# Patient Record
Sex: FEMALE | Race: WHITE | Employment: STUDENT | URBAN - METROPOLITAN AREA
[De-identification: names, ages, dates, MRNs, and addresses within clinical notes are randomized per-mention and may not be internally consistent; named-entity substitution may affect disease eponyms.]

---
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
## 2024-01-16 ENCOUNTER — ANCILLARY PROCEDURE (OUTPATIENT)
Dept: PEDIATRIC CARDIOLOGY | Age: 19
End: 2024-01-16
Attending: INTERNAL MEDICINE

## 2024-01-16 ENCOUNTER — OFFICE VISIT (OUTPATIENT)
Dept: PEDIATRIC CARDIOLOGY | Age: 19
End: 2024-01-16
Attending: INTERNAL MEDICINE

## 2024-01-16 ENCOUNTER — TELEPHONE (OUTPATIENT)
Dept: CARDIOLOGY | Age: 19
End: 2024-01-16

## 2024-01-16 VITALS
BODY MASS INDEX: 17.79 KG/M2 | TEMPERATURE: 98.1 F | HEIGHT: 60 IN | WEIGHT: 90.61 LBS | HEART RATE: 76 BPM | DIASTOLIC BLOOD PRESSURE: 60 MMHG | OXYGEN SATURATION: 100 % | SYSTOLIC BLOOD PRESSURE: 103 MMHG

## 2024-01-16 DIAGNOSIS — Z98.890 STATUS POST CARDIAC SURGERY: ICD-10-CM

## 2024-01-16 DIAGNOSIS — Q21.10 ATRIAL SEPTAL DEFECT: ICD-10-CM

## 2024-01-16 DIAGNOSIS — Q26.3 PARTIAL ANOMALOUS PULMONARY VENOUS RETURN (PAPVR): ICD-10-CM

## 2024-01-16 DIAGNOSIS — Q21.16 ASD (ATRIAL SEPTAL DEFECT), SINUS VENOSUS DEFECT: ICD-10-CM

## 2024-01-16 DIAGNOSIS — Q21.14 SUPERIOR SINUS VENOSUS ATRIAL SEPTAL DEFECT (ASD): Primary | ICD-10-CM

## 2024-01-16 DIAGNOSIS — Q21.16 ASD (ATRIAL SEPTAL DEFECT), SINUS VENOSUS DEFECT: Primary | ICD-10-CM

## 2024-01-16 LAB
AORTIC ROOT: 2.1 CM (ref 1.95–2.76)
AORTIC VALVE ANNULUS: 1.5 CM (ref 1.38–2.01)
ASCENDING AORTA: 1.8 CM (ref 1.64–2.46)
BSA FOR PED ECHO PROCEDURE: 1.31 M2
FRACTIONAL SHORTENING MMODE: 36 %
IVSS (M-MODE): 0.87 CM
LEFT VENTRICULAR POSTERIOR WALL IN END DIASTOLE MMODE: 0.51 CM (ref 0.44–0.83)
LEFT VENTRICULAR POSTERIOR WALL SYSTOLE MMODE: 0.87 CM
LV END-DIASTOLIC ENDOCARDIAL DIAMETER MMODE: 3.86 CM (ref 3.65–5.13)
LV END-DIASTOLIC SEPTAL THICKNESS MMODE: 0.51 CM (ref 0.45–0.84)
LVIDS BY MMODE: 2.48 CM
RIGHT VENTRICULAR END DIASTOLIC DIAS: 1.79 CM
SINOTUBULAR JUNCTION: 1.8 CM (ref 1.57–2.3)
Z SCORE OF AORTIC VALVE ANNULUS PHN: -1.2 CM
Z SCORE OF LEFT VENTRICULAR POSTERIOR WALL IN END DIASTOLE MMODE: -1.2 CM
Z SCORE OF LV END-DIASTOLIC ENDOCARDIAL DIAMETER MMODE: -1.4 CM
Z SCORE OF LV END-DIASTOLIC SEPTAL THICKNESS MMODE: -1.4 CM
Z-SCORE OF AORTIC ROOT: -1.3 CM
Z-SCORE OF ASCENDING AORTA: -1.2 CM
Z-SCORE OF SINOTUBULAR JUNCTION PHN: -0.7 CM

## 2024-01-16 PROCEDURE — 93303 ECHO TRANSTHORACIC: CPT

## 2024-01-16 PROCEDURE — 93325 DOPPLER ECHO COLOR FLOW MAPG: CPT | Performed by: INTERNAL MEDICINE

## 2024-01-16 PROCEDURE — 93320 DOPPLER ECHO COMPLETE: CPT | Performed by: INTERNAL MEDICINE

## 2024-01-16 PROCEDURE — 93005 ELECTROCARDIOGRAM TRACING: CPT | Performed by: INTERNAL MEDICINE

## 2024-01-16 PROCEDURE — 93303 ECHO TRANSTHORACIC: CPT | Performed by: INTERNAL MEDICINE

## 2024-01-16 PROCEDURE — 99214 OFFICE O/P EST MOD 30 MIN: CPT | Performed by: INTERNAL MEDICINE

## 2024-01-16 ASSESSMENT — PAIN SCALES - GENERAL: PAINLEVEL: 0

## 2024-01-18 PROBLEM — I26.99 PE (PULMONARY THROMBOEMBOLISM) (HCC): Status: ACTIVE | Noted: 2023-06-20

## 2024-01-18 LAB
ATRIAL RATE (BPM): 77
P AXIS (DEGREES): 46
PR-INTERVAL (MSEC): 170
QRS-INTERVAL (MSEC): 78
QT-INTERVAL (MSEC): 368
QTC: 416
R AXIS (DEGREES): 125
REPORT TEXT: NORMAL
T AXIS (DEGREES): 65
VENTRICULAR RATE EKG/MIN (BPM): 77

## 2024-03-07 ENCOUNTER — HOSPITAL ENCOUNTER (OUTPATIENT)
Dept: GENERAL RADIOLOGY | Age: 19
Discharge: HOME | End: 2024-03-07
Attending: INTERNAL MEDICINE
Payer: COMMERCIAL

## 2024-03-07 DIAGNOSIS — R68.84 JAW PAIN: ICD-10-CM

## 2024-03-07 PROCEDURE — 70110 X-RAY EXAM OF JAW 4/> VIEWS: CPT | Performed by: INTERNAL MEDICINE

## 2024-04-04 ENCOUNTER — HOSPITAL ENCOUNTER (OUTPATIENT)
Age: 19
Discharge: HOME | End: 2024-04-04
Payer: COMMERCIAL

## 2024-04-04 VITALS
RESPIRATION RATE: 16 BRPM | TEMPERATURE: 98 F | OXYGEN SATURATION: 96 % | HEART RATE: 96 BPM | DIASTOLIC BLOOD PRESSURE: 80 MMHG | SYSTOLIC BLOOD PRESSURE: 107 MMHG

## 2024-04-04 DIAGNOSIS — N30.01 ACUTE CYSTITIS WITH HEMATURIA: Primary | ICD-10-CM

## 2024-04-04 LAB
B-HCG UR QL: NEGATIVE
BILIRUB UR QL STRIP: NEGATIVE
COLOR UR: YELLOW
GLUCOSE UR STRIP-MCNC: NEGATIVE MG/DL
KETONES UR STRIP-MCNC: NEGATIVE MG/DL
NITRITE UR QL STRIP: POSITIVE
PH UR STRIP: 7 [PH]
PROT UR STRIP-MCNC: 30 MG/DL
SP GR UR STRIP: 1.02
UROBILINOGEN UR STRIP-ACNC: <2 MG/DL

## 2024-04-04 PROCEDURE — 81002 URINALYSIS NONAUTO W/O SCOPE: CPT | Performed by: NURSE PRACTITIONER

## 2024-04-04 PROCEDURE — 87186 SC STD MICRODIL/AGAR DIL: CPT | Performed by: NURSE PRACTITIONER

## 2024-04-04 PROCEDURE — 87086 URINE CULTURE/COLONY COUNT: CPT | Performed by: NURSE PRACTITIONER

## 2024-04-04 PROCEDURE — 87088 URINE BACTERIA CULTURE: CPT | Performed by: NURSE PRACTITIONER

## 2024-04-04 PROCEDURE — 99213 OFFICE O/P EST LOW 20 MIN: CPT | Performed by: NURSE PRACTITIONER

## 2024-04-04 PROCEDURE — 81025 URINE PREGNANCY TEST: CPT | Performed by: NURSE PRACTITIONER

## 2024-04-04 RX ORDER — NITROFURANTOIN 25; 75 MG/1; MG/1
100 CAPSULE ORAL 2 TIMES DAILY
Qty: 10 CAPSULE | Refills: 0 | Status: SHIPPED | OUTPATIENT
Start: 2024-04-04 | End: 2024-04-09

## 2024-04-04 RX ORDER — PHENAZOPYRIDINE HYDROCHLORIDE 200 MG/1
200 TABLET, FILM COATED ORAL 3 TIMES DAILY PRN
Qty: 6 TABLET | Refills: 0 | Status: SHIPPED | OUTPATIENT
Start: 2024-04-04 | End: 2024-04-11

## 2024-04-04 NOTE — ED PROVIDER NOTES
Patient Seen in: [facility]      History     Chief Complaint   Patient presents with    Urinary Symptoms     Entered by patient     Stated Complaint: Urinary Symptoms    Subjective:   17-year-old female status post ASD repair, subsequent PE - no longer anticoagulated presents from home with concern for recurrent UTI.  States 2 days of mild hematuria with urgency and dysuria.  Associated foul-smelling urine.  No fever.  No vomiting.  Denies risk for STD.  Denies pregnancy.  States she recently had a UTI that was treated with antibiotics and resolved.  Prior to that no previous history of UTI.  Also notes left-sided pain intermittent for 2 months.    The history is provided by the patient. No  was used.         Objective:   History reviewed. No pertinent past medical history.           Past Surgical History:   Procedure Laterality Date    ANESTH,OPEN HEART SURGERY  06/14/2023                Social History     Socioeconomic History    Marital status: Single   Tobacco Use    Smoking status: Never    Smokeless tobacco: Never   Vaping Use    Vaping Use: Never used   Substance and Sexual Activity    Alcohol use: Never    Drug use: Never   Other Topics Concern    Second-hand smoke exposure No    Caffeine Concern No    Exercise No    Seat Belt No    Special Diet No    Stress Concern No    Weight Concern No              Review of Systems    Positive for stated complaint: Urinary Symptoms  Other systems are as noted in HPI.  Constitutional and vital signs reviewed.      All other systems reviewed and negative except as noted above.    Physical Exam     ED Triage Vitals [04/04/24 1128]   /80   Pulse 96   Resp 16   Temp 98 °F (36.7 °C)   Temp src Temporal   SpO2 96 %   O2 Device None (Room air)       Current:/80   Pulse 96   Temp 98 °F (36.7 °C) (Temporal)   Resp 16   LMP 03/07/2024 (Exact Date)   SpO2 96%         Physical Exam  Vitals and nursing note reviewed.    Constitutional:       General: She is not in acute distress.     Appearance: Normal appearance. She is not ill-appearing or toxic-appearing.   HENT:      Head: Normocephalic and atraumatic.      Nose: Nose normal.      Mouth/Throat:      Mouth: Mucous membranes are moist.      Pharynx: Oropharynx is clear.   Eyes:      Pupils: Pupils are equal, round, and reactive to light.   Cardiovascular:      Rate and Rhythm: Normal rate and regular rhythm.      Pulses: Normal pulses.   Pulmonary:      Effort: Pulmonary effort is normal. No respiratory distress.      Breath sounds: Normal breath sounds.      Comments: Lungs clear.  No adventitious lung sounds.  No distress.  No hypoxia.  Pulse ox 96% ra. Which is normal    Abdominal:      General: Abdomen is flat.      Palpations: Abdomen is soft.      Tenderness: There is no abdominal tenderness. There is no right CVA tenderness or left CVA tenderness.      Comments: Bladder pressure   Musculoskeletal:         General: No signs of injury. Normal range of motion.      Cervical back: Normal range of motion and neck supple.   Skin:     General: Skin is warm and dry.      Capillary Refill: Capillary refill takes less than 2 seconds.   Neurological:      General: No focal deficit present.      Mental Status: She is alert and oriented to person, place, and time.      GCS: GCS eye subscore is 4. GCS verbal subscore is 5. GCS motor subscore is 6.   Psychiatric:         Mood and Affect: Mood normal.         Behavior: Behavior normal.         Thought Content: Thought content normal.         Judgment: Judgment normal.         ED Course     Labs Reviewed   [facility] POCT URINALYSIS DIPSTICK - Abnormal; Notable for the following components:       Result Value    Urine Clarity Cloudy (*)     Protein urine 30 (*)     Blood, Urine Small (*)     Nitrite Urine Positive (*)     Leukocyte esterase urine Moderate (*)     All other components within normal limits   POCT PREGNANCY URINE - Normal   URINE  CULTURE, ROUTINE     Recent Results (from the past 24 hour(s))   POCT Urinalysis Dipstick    Collection Time: 04/04/24 11:40 AM   Result Value Ref Range    Urine Color Yellow Yellow    Urine Clarity Cloudy (A) Clear    Specific Gravity, Urine 1.025 1.005 - 1.030    PH, Urine 7.0 5.0 - 8.0    Protein urine 30 (A) Negative mg/dL    Glucose, Urine Negative Negative mg/dL    Ketone, Urine Negative Negative mg/dL    Bilirubin, Urine Negative Negative    Blood, Urine Small (A) Negative    Nitrite Urine Positive (A) Negative    Urobilinogen urine <2.0 <2.0 mg/dL    Leukocyte esterase urine Moderate (A) Negative   POCT Pregnancy, Urine    Collection Time: 04/04/24 11:44 AM   Result Value Ref Range    POCT Urine Pregnancy Negative Negative         MDM        Medical Decision Making  Differential diagnosis: UTI, pyelonephritis, STD  Pregnancy negative  UA showing infection with moderate leuks, positive nitrates, small blood, small protein.  Urine culture was sent.  No previous urine culture on file to review   Nontoxic-appearing on exam. No fever.  No vomiting.  Complaining of intermittent left side pain for several months.  No acute CVA tenderness.  No evidence of pyelonephritis or renal stone.  Denies risk for STD  Plan of care: Macrobid, Pyridium.  Push oral fluids.  Schedule follow-up with primary doctor    Results and plan of care discussed with the patient/family. They are in agreement with discharge. They understand to follow up with their primary doctor or the referral physician for further evaluation, especially if no improvement.  Also discussed the limitations of immediate care, patient is aware that if symptoms are worse they should go to the emergency room. Verbal and written discharge instructions were given.       Problems Addressed:  Acute cystitis with hematuria: acute illness or injury    Amount and/or Complexity of Data Reviewed  External Data Reviewed: notes.     Details: Seen at  12/23 given bactrim for  uti  ASD repair 6/14/23. PE 6/20/23  No previous urine culture on file  Labs: ordered. Decision-making details documented in ED Course.    Risk  OTC drugs.  Prescription drug management.        Disposition and Plan     Clinical Impression:  1. Acute cystitis with hematuria         Disposition:  Discharge  4/4/2024 11:50 am    Follow-up:  Alex Villegas MD  [address]  [place] [address]  [phone]                Medications Prescribed:  Discharge Medication List as of 4/4/2024 11:52 AM        START taking these medications    Details   nitrofurantoin monohydrate macro 100 MG Oral Cap Take 1 capsule (100 mg total) by mouth 2 (two) times daily for 5 days., Normal, Disp-10 capsule, R-0      phenazopyridine 200 MG Oral Tab Take 1 tablet (200 mg total) by mouth 3 (three) times daily as needed for Pain., Normal, Disp-6 tablet, R-0

## 2024-04-04 NOTE — DISCHARGE INSTRUCTIONS
Take the antibiotic twice a day until gone.  Take pyridium for the urge to urinate. Increase water intake.  Urine culture is pending and results will be available in about 48 hours.  We will call you if any medication changes are needed.  Schedule follow-up with your primary doctor.

## 2024-04-11 ENCOUNTER — OFFICE VISIT (OUTPATIENT)
Dept: OTOLARYNGOLOGY | Facility: CLINIC | Age: 19
End: 2024-04-11

## 2024-04-11 DIAGNOSIS — H92.01 RIGHT EAR PAIN: Primary | ICD-10-CM

## 2024-04-11 PROCEDURE — 99203 OFFICE O/P NEW LOW 30 MIN: CPT | Performed by: OTOLARYNGOLOGY

## 2024-04-11 RX ORDER — DOXYCYCLINE HYCLATE 100 MG
TABLET ORAL
COMMUNITY
Start: 2024-01-29

## 2024-04-11 RX ORDER — CELECOXIB 200 MG/1
200 CAPSULE ORAL DAILY PRN
Qty: 30 CAPSULE | Refills: 0 | Status: SHIPPED | OUTPATIENT
Start: 2024-04-11 | End: 2024-05-11

## 2024-04-11 RX ORDER — CYCLOBENZAPRINE HCL 5 MG
5 TABLET ORAL NIGHTLY
Qty: 30 TABLET | Refills: 1 | Status: SHIPPED | OUTPATIENT
Start: 2024-04-11

## 2024-04-11 NOTE — PROGRESS NOTES
Harriet Seals is a 18 year old female.    Chief Complaint   Patient presents with    Jaw Pain     Patient reports pain on the right side of jaw. X 2 years.        HISTORY OF PRESENT ILLNESS  She presents for history of developing ear discomfort and inability to open her mouth back about 2 years ago when she was in [place].  States that at that time she was chattering due to being very cold.  States that at some point she had difficulty opening her mouth completely and this resolved spontaneously over time.  Had other issues and was seen by holistic person who used warm heat to the area as well as chewing gum to alleviate her symptoms.  About 2 months ago things worsened significantly and now she has chronic pain and discomfort has difficulty chewing and pain with headaches in the temporoparietal region.  Has an interesting history of incidental finding of an EKG abnormality at school with ultimate finding of a hole in her heart which was recently repaired last summer.  She did have significant stress around that time.  Admits to grinding seen by dentist who essentially told her that nothing was wrong but never offered her a bite guard.  Signs, symptoms or complaints      Social History     Socioeconomic History    Marital status: Single   Tobacco Use    Smoking status: Never    Smokeless tobacco: Never   Vaping Use    Vaping status: Never Used   Substance and Sexual Activity    Alcohol use: Never    Drug use: Never   Other Topics Concern    Second-hand smoke exposure No    Caffeine Concern No    Exercise No    Seat Belt No    Special Diet No    Stress Concern No    Weight Concern No       Family History   Problem Relation Age of Onset    Hypertension Maternal Grandmother     Diabetes Maternal Grandmother     Hypertension Paternal Grandmother     Diabetes Paternal Grandmother     Glaucoma Neg     Heart Disorder Neg     Cancer Neg     Asthma Neg     Depression Neg     Lipids Neg        History reviewed. No pertinent  past medical history.    Past Surgical History:   Procedure Laterality Date    Anesth,open heart surgery  06/14/2023         REVIEW OF SYSTEMS    System Neg/Pos Details   Constitutional Negative Fatigue, fever and weight loss.   ENMT Negative Drooling.   Eyes Negative Blurred vision and vision changes.   Respiratory Negative Dyspnea and wheezing.   Cardio Negative Chest pain, irregular heartbeat/palpitations and syncope.   GI Negative Abdominal pain and diarrhea.   Endocrine Negative Cold intolerance and heat intolerance.   Neuro Negative Tremors.   Psych Negative Anxiety and depression.   Integumentary Negative Frequent skin infections, pigment change and rash.   Hema/Lymph Negative Easy bleeding and easy bruising.           PHYSICAL EXAM    [facility] 03/07/2024 (Exact Date)        Constitutional Normal Overall appearance - Normal.   Psychiatric Normal Orientation - Oriented to time, place, person & situation. Appropriate mood and affect.   Neck Exam Normal Inspection - Normal. Palpation - Normal. Parotid gland - Normal. Thyroid gland - Normal.   Eyes Normal Conjunctiva - Right: Normal, Left: Normal. Pupil - Right: Normal, Left: Normal. Fundus - Right: Normal, Left: Normal.   Neurological Normal Memory - Normal. Cranial nerves - Cranial nerves II through XII grossly intact.   Head/Face Normal Facial features - Normal. Eyebrows - Normal. Skull - Normal.        Nasopharynx Normal External nose - Normal. Lips/teeth/gums - Normal. Tonsils - Normal. Oropharynx - Normal.   Ears Normal Inspection - Right: Normal, Left: Normal. Canal - Right: Normal, Left: Normal. TM - Right: Normal, Left: Normal.   Skin Normal Inspection - Normal.        Lymph Detail Normal Submental. Submandibular. Anterior cervical. Posterior cervical. Supraclavicular.   TMJ  Tender to palpation bilaterally   Nose/Mouth/Throat Normal External nose - Normal. Lips/teeth/gums - Normal. Tonsils - Normal. Oropharynx - Normal.   Nose/Mouth/Throat Normal Nares -  Right: Normal Left: Normal. Septum -Normal  Turbinates - Right: Normal, Left: Normal.       Current Outpatient Medications:     Doxycycline Hyclate 100 MG Oral Tab, , Disp: , Rfl:     celecoxib 200 MG Oral Cap, Take 1 capsule (200 mg total) by mouth daily as needed for Pain., Disp: 30 capsule, Rfl: 0    cyclobenzaprine 5 MG Oral Tab, Take 1 tablet (5 mg total) by mouth nightly., Disp: 30 tablet, Rfl: 1    phenazopyridine 200 MG Oral Tab, Take 1 tablet (200 mg total) by mouth 3 (three) times daily as needed for Pain., Disp: 6 tablet, Rfl: 0  ASSESSMENT AND PLAN    1. Right ear pain  TMJ issues.  Very tender TMJ to touch right greater than left.  I have recommended TMJ therapy and I have asked her to meet with her dentist to look into getting a bite guard made for her.  I will start her on cyclobenzaprine Celebrex warm heat soft diet and she will chew on both sides of her mouth.  Return to see me in 1 month for reevaluation.  - Physical Therapy Referral - [facility]        This note was prepared using [employer] voice recognition dictation software. As a result errors may occur. When identified these errors have been corrected. While every attempt is made to correct errors during dictation discrepancies may still exist    Tyshawn Darnell MD    4/11/2024    10:03 AM

## 2024-11-04 ENCOUNTER — E-ADVICE (OUTPATIENT)
Dept: PEDIATRIC CARDIOLOGY | Age: 19
End: 2024-11-04

## 2024-11-04 ENCOUNTER — APPOINTMENT (OUTPATIENT)
Dept: MRI IMAGING | Age: 19
End: 2024-11-04
Attending: INTERNAL MEDICINE

## 2024-11-11 ENCOUNTER — HOSPITAL ENCOUNTER (OUTPATIENT)
Dept: MRI IMAGING | Age: 19
Discharge: HOME | End: 2024-11-11
Attending: INTERNAL MEDICINE

## 2024-11-11 DIAGNOSIS — Q26.3 PARTIAL ANOMALOUS PULMONARY VENOUS RETURN (PAPVR) (CMD): ICD-10-CM

## 2024-11-11 DIAGNOSIS — Q21.14 SUPERIOR SINUS VENOSUS ATRIAL SEPTAL DEFECT (ASD) (CMD): ICD-10-CM

## 2024-11-11 DIAGNOSIS — Q21.10 ASD (ATRIAL SEPTAL DEFECT) (CMD): ICD-10-CM

## 2024-11-11 PROCEDURE — 75561 CARDIAC MRI FOR MORPH W/DYE: CPT | Performed by: PEDIATRICS

## 2024-11-11 PROCEDURE — 71555 MRI ANGIO CHEST W OR W/O DYE: CPT

## 2024-11-11 PROCEDURE — 10002805 HB CONTRAST AGENT: Performed by: INTERNAL MEDICINE

## 2024-11-11 PROCEDURE — 75561 CARDIAC MRI FOR MORPH W/DYE: CPT

## 2024-11-11 PROCEDURE — A9585 GADOBUTROL INJECTION: HCPCS | Performed by: INTERNAL MEDICINE

## 2024-11-11 PROCEDURE — 71555 MRI ANGIO CHEST W OR W/O DYE: CPT | Performed by: PEDIATRICS

## 2024-11-11 RX ORDER — GADOBUTROL 604.72 MG/ML
5.3 INJECTION INTRAVENOUS ONCE
Status: COMPLETED | OUTPATIENT
Start: 2024-11-11 | End: 2024-11-11

## 2024-11-11 RX ADMIN — GADOBUTROL 5.3 ML: 604.72 INJECTION INTRAVENOUS at 10:35

## 2024-11-27 ENCOUNTER — TELEPHONE (OUTPATIENT)
Dept: PEDIATRIC CARDIOLOGY | Age: 19
End: 2024-11-27

## 2025-02-11 ENCOUNTER — E-ADVICE (OUTPATIENT)
Dept: PEDIATRIC CARDIOLOGY | Age: 20
End: 2025-02-11

## 2025-03-24 ENCOUNTER — TELEPHONE (OUTPATIENT)
Dept: PHYSICAL THERAPY | Facility: HOSPITAL | Age: 20
End: 2025-03-24

## 2025-03-25 ENCOUNTER — OFFICE VISIT (OUTPATIENT)
Dept: PHYSICAL THERAPY | Age: 20
End: 2025-03-25
Payer: COMMERCIAL

## 2025-03-25 DIAGNOSIS — R39.15 URINARY URGENCY: Primary | ICD-10-CM

## 2025-03-25 PROCEDURE — 97530 THERAPEUTIC ACTIVITIES: CPT

## 2025-03-25 PROCEDURE — 97162 PT EVAL MOD COMPLEX 30 MIN: CPT

## 2025-03-25 NOTE — PROGRESS NOTES
MUSCULOSKELETAL AND PELVIC FLOOR EVALUATION:     Diagnosis:   Urinary urgency (R39.15) Patient:  Harriet Seals (19 year old, female)        Referring Provider: Mary Merino  Today's Date   3/25/2025    Precautions:  None (hx of open heart surgery)   Date of Evaluation: 03/25/25  Next MD visit: none scheduled yet  Date of Surgery: n/a     PATIENT SUMMARY   Summary of chief complaints: urinary urgency, unable to empty bladder all the way, pain with sex  History of current condition: Pt had gotten a UTI, treated things went back to normal, then had a 2nd UTI and feels like it was treated she was cleared but urgency remained, and has been getting worse over time since. Had a 1 month trip to [facility] was doing a lot of squatting and JIC bathroom use during that trip. Now, feels like she can't empty her bladder all the way, and is going pee often, can't make it through a lecture at school before needing to go again. Started drinking less water as a result. Has recently started going to the gym, 3x/wk, 2 days strength, 1 day cardio.   Pain level: current 0 /10, at best 0 /10, at worst 0 /10 (not quantified)  Description of symptoms: not as much pain, but urgency   Occupation: college student, works at [employer]   Leisure activities/Hobbies: working out 3x/wk   Prior level of function: no issues prior to initial UTI  Current limitations: limited at school and work because always feeling like she needs to go to the bathroom  Pt goals: decreased urgency, be able to empty bladder all the way  Pregnant Now: No   OB History   No obstetric history on file.     Urodynamic Test: no   Manometry: no   PFDI 20    Scores   POPDI 6:   16.67    CRAD 8:   0    NIDA 6:   54.17    Summary:   70.84       Outpatient Therapy Pelvic Health Intake       Question 3/25/2025 11:25 AM CDT - Incomplete    Do you usually experience pressure in the lower abdomen? Quite a bit    Do you usually experience heaviness or dullness in the pelvic  area? Moderately    Do you usually have a bulge or something falling out that you can see or feel in your vaginal area? Not present    Do you ever have to push on the vagina or around the rectum to have or complete a bowel movement? Not at all    Do you usually experience a feeling of incomplete bladder emptying? Quite a bit    Do you ever have to push up on a bulge in the vaginal area with your fingers to start or complete urination?       Please provide details on the following urinary history / complaints     Frequency of urination: # of times/day 4    Frequency of urination: # of times/night 0    When you have the urge to urinate, how long can you delay before you have to go to the toilet? (# of minutes or hours)       Do you have a history of urinary tract infections:       Do you have a history of urine loss (select all that apply)       How many times a day does leakage occur?       If you have leakage, what type(s) of protective device(s) do you use? (Select all that apply)       On average, how many pads do you use each day?       Do you soak the pad fully?       Do you change the pad each time it is wet?       Do you experience any of these symptoms? (Select all that apply)       Do you usually experience frequent urination?       Do you usually experience urine leakage associated with a feeling of urgency, that is, a strong sensation of needing to go to the bathroom?       Do you usually experience urine leakage related to coughing, sneezing, or laughing?       Do you usually experience small amounts of urine leakage (that is, drops)?       Do you usually experience difficulty empyting your bladder?       Do you usually experience pain or discomfort in the lower abdomen or genital region?       Have you ever had any of the following treatment:     Have you ever done exercises to control urine loss? If so, for how long?       Has your doctor ever prescribed any medication for urine loss?       Have you had  any surgical procedures to treat urine loss?       Please provide details on the following bowel history / complaints.     How many bowel movements do you have per day?       How many bowel movements do you have per night?       Do you experience diarrhea? If yes, how often?       Do you feel you need to strain too hard to have a bowel movement?       Do you feel you have not completely emptied your bowels at the end of a bowel movement?       Do you usually lose stool beyond your control if your stool is well formed?       Do you usually lose stool beyond your control if your stool is loose?       Do you usually lose gas from the rectum beyond your control?       Do you usually have pain when you pass your stool?       Do you experience strong sense of urgency and have to rush to bathroom for bowel movement?       Does part of bowel ever pass through rectum and bulge outside during/after bowel movement?       Please provide details on your daily fluid/food intake.     On average, what is your daily fluid intake (1 glass=8ounces)? (# of glasses per day)       How many are caffeinated? (# of glasses per day)       Do you restrict fluids because of incontenence (leakage)?       Do you include fiber in your diet (fruits, vegetables, bran, etc.)?       Psychosocial information     Do you live alone?       Which recreational activities do you participate in if any?       Have you had to restrict your activities due to your pelvic health concerns?       On a scale of 0 (no impairments) to 10 (severe impairments), what are your feelings about your urinary or bowel incontinence or pelvic pain?       Do you have pain with intercourse?       Have you had any changes in intimate relationships/sexual function due to your symptoms?        Your personal safety is of utmost importance to us at [facility], please answer the following questions:     Are you being hurt, frightened, demeaned, or taken advantage of by  anyone at your home or in your life?        Have you recently had thoughts of hurting yourself?       Have you tried to hurt yourself in the past?                Bowel Health:   Constipated  BM every 2-3 days  No straining  Tries to get some fruits/veggies in but could be better, a lot of eating on the go    Water 16 ounces/day    Workouts: glutes/quads, arms/core/abs  Cardio: run 35-40 min, slow incline    Sexual Health Status   Sexual intercourse status: discomfort with sex, with deep penetration, urgency and leakage     Past medical history was reviewed with Harriet.  Significant findings include: open heart surgery 2 years ago  Imaging/Tests: no recent   Harriet  has no past medical history on file.  She  has a past surgical history that includes anesth,open heart surgery (06/14/2023).    ASSESSMENT  Harriet presents to physical therapy evaluation with primary c/o urinary urgency, unable to empty bladder all the way, pain with sex. The results of the objective tests and measures show symptoms with severe distress per PFDI20 score, increased PFM tension after contraction increased time required to come down to baseline, increased urinary urgency with palpation of LA. Functional deficits include but are not limited to limited at school and work because always feeling like she needs to go to the bathroom. Signs and symptoms are consistent with diagnosis of  . Pt and PT discussed evaluation findings, pathology, POC and HEP.  Pt voiced understanding and performs HEP correctly without reported pain. Skilled Physical Therapy is medically necessary to address the above impairments and reach functional goals.    OBJECTIVE:    Musculoskeletal  Posture: Posture: WNL   Pelvic Alignment: WNL     Informed consent for internal pelvic evaluation given:Yes     External Observation:   Voluntary contraction: present   Voluntary relaxation: present (increased time required)   Involuntary contraction:     Involuntary relaxation:      Mons pubis: WNL   Labia majora: WNL  Labia minora: WNL   Urethral meatus: WNL   Introitus: WNL   Perineal body: WNL  Anus/External Anal Sphincter: WNL    Internal Examination     Pelvic Floor Muscle strength: (PERF= Power/Endurance/Reps/Fast) MMT:  Power Endurance REPS Fast   3 - 3 -- (increased time to come down to baseline due to increased tone)     External Anal Sphincter: not manually assessed   Accessory Muscle Use: none     Tissue Laxity Test:  Anterior Wall: WNL  Posterior Wall: WNL   Apical: WNL     Eccentric lengthening contraction: intact   Bearing down Valsalva Maneuver (2-3x):  not tested     Internal Palpation:    Superficial Transverse Perineal  minimal restriction   Bulbospongiosus minimal restriction   Ischiocavernosus minimal restriction   Deep Transverse Perineal minimal restriction   Compress Urethra/Sphincter not tested   Urethrovaginalis not tested   Pubococcygeus minimal restriction   Iliococcygeus minimal restriction   Coccygeus not tested   Piriformis (palpated externally)  not tested   Obturator Internus  -- (WNL)   Pelvic Clock not tested     Neurological   Cough Reflex: present     ORTHO screen deferred to next session due to time constraints    Balance and Functional Mobility  Gait: pt ambulates on level ground with normal mechanics.  SLS EO: R      L          Today's Treatment and Response:   Pt education was provided on exam findings, treatment diagnosis, treatment plan, expectations, and prognosis.  Today's Treatment       3/25/2025   Pelvic Treatment   Therapeutic Activity Pt ed on findings of internal PFM assessment and on how they may be impacting symptoms.     Discussed psychosocial aspects of pelvic pain and utility of down-training PFM. Educated on pelvic floor and pelvic organ anatomy and function (with use of model). Discussed causes of constipation and strategies for mitigating symptoms including increased fiber, water, and exercise. Discussed common contributing factors to  urgency, as well as strategies for improving bladder control including proper hydration, avoiding \"JIC\" urination, avoiding bladder irritants, optimizing water intake. Discussed ultimate goal of urinating 1x every 2.5-3 hours or 5-7x in a 24 hour period.      Therapeutic Activity Minutes 15   Evaluation Minutes 35   Total Time Of Timed Procedures 15   Total Time Of Service-Based Procedures 35   Total Treatment Time 50   HEP Bladder diary, increase water intake to 48 ounces/day, slowly work to increase fiber to 20-25g/day, double void technique        Patient was instructed in and issued a HEP for: Bladder diary, increase water intake to 48 ounces/day, slowly work to increase fiber to 20-25g/day, double void technique    Charges:  PT EVAL: Moderate Complexity, 1 TA  In agreement with evaluation findings and clinical rationale, this evaluation involved MODERATE COMPLEXITY decision making due to 1-2 personal factors/comorbidities, 3 or more body structures involved/activity limitations, and evolving symptoms as documented in the evaluation.                                                                       PLAN OF CARE:    Goals: (to be met in 10 visits)    Not Met Progress Toward Partially Met Met   Patient will report decreased urinary frequency to <8x/day and 0x/night indicating normalizing micturition reflex for improved bladder health and function. [] [] [] []   Patient will report ability to postpone urination for at least 10 mins after initial urge presents for improved ability to participate in community outings without fear of UI. [] [] [] []   Patient will report increased BM frequency to at least 1x every other day indicating improved stool motility and overall bowel health. [] [] [] []   Patient will report consumption of at least 25 grams of daily fiber and 48 ounces of water for improved stool consistency. [] [] [] []   Patient will report adherence to HEP for continued exercise benefits following  cessation of PT. [] [] [] []          Frequency / Duration: Patient will be seen 1x/week or a total of 10  visits over a 90 day period. Treatment will include: Manual Therapy; Neuromuscular Re-education; Self-Care Home Management; Therapeutic Activities; Therapeutic Exercise; Home Exercise Program instruction    Education or treatment limitation: None   Rehab Potential: good       Patient/Family/Caregiver was advised of these findings, precautions, and treatment options and has agreed to actively participate in planning and for this course of care.    Thank you for your referral. Please co-sign or sign and return this letter via fax as soon as possible to [phone]. If you have any questions, please contact me at Dept: [phone]    Sincerely,  Electronically signed by therapist: Silvia Mohr PT  Physician's certification required: Yes  I certify the need for these services furnished under this plan of treatment and while under my care.    X___________________________________________________ Date____________________    Certification From: 3/25/2025  To:6/23/2025

## 2025-04-01 ENCOUNTER — OFFICE VISIT (OUTPATIENT)
Dept: PHYSICAL THERAPY | Age: 20
End: 2025-04-01
Payer: COMMERCIAL

## 2025-04-01 PROCEDURE — 97110 THERAPEUTIC EXERCISES: CPT

## 2025-04-01 PROCEDURE — 97530 THERAPEUTIC ACTIVITIES: CPT

## 2025-04-01 PROCEDURE — 97112 NEUROMUSCULAR REEDUCATION: CPT

## 2025-04-01 NOTE — PROGRESS NOTES
Patient: Harriet Seals (19 year old, female) Referring Provider:  Insurance:   Diagnosis: Urinary urgency (R39.15) Mary Merion  [employer] [place]   Date of Surgery: n/a Next MD visit:  N/A   Precautions:  None (hx of open heart surgery) none scheduled yet Referral Information:    Date of Evaluation: Req: 12, Auth: 12, Exp: 5/15/2025    03/25/25 POC Auth Visits:           Today's Date   4/1/2025    Subjective  Pt reports she feels more relief now and like she is getting more pee out. Pressure is not as bad either. Completed bladder diary.        Pain: 0/10     Objective  see chart, bladder diary review, ortho screen       Musculoskeletal  Posture: WNL   Pelvic Alignment:  WNL   External Palpation:  no pain at B hip regions  Scars (location/surgery):    Abdominal Wall Integrity:  intact   Diastasis Recti (finger width depth while contracted): none    Above Umbilicus:      Umbilicus:      Below Umbilicus:     Special Tests:  none    MONCHO ROM WNL and Strength (5/5) except below:  (* denotes performed with pain)  L hip abd 4+/5, add 4+/5, hip IR 4+/5, hip ER 4+/5, hip ext 4/5    Flexibility:  LE Flexibility R L     Hip Flexor  WNL  WNL     Hamstrings Min johnson  Min johnson     ITB WNL  WNL     Piriformis  WNL  Min johnson     Quads  WNL  WNL     Gastroc-soleus  NT  NT     Balance and Functional Mobility  Gait: WNL  SLS EO: R  10s+    L  10s+  Functional Squat:  SL Squat: R    WNL      L hip tight    Bladder diary review: voids 4x, in AM 6hr span no void     Assessment  Pt with some improvements after bladder habit changes started over the past week. Pt with tight L hip region muscles as compared to R side, added mobility and stretching to HEP this week to address. Worked on breathing mechanics for squats. Pt would benefit from trial of setting timer between voids to not go too long between emptying bladder when at work.     Goals (to be met in 10 visits)    Not Met Progress Toward Partially Met Met   Patient will report decreased  urinary frequency to <8x/day and 0x/night indicating normalizing micturition reflex for improved bladder health and function. [] [x] [] []   Patient will report ability to postpone urination for at least 10 mins after initial urge presents for improved ability to participate in community outings without fear of UI. [] [x] [] []   Patient will report increased BM frequency to at least 1x every other day indicating improved stool motility and overall bowel health. [] [x] [] []   Patient will report consumption of at least 25 grams of daily fiber and 48 ounces of water for improved stool consistency. [] [x] [] []   Patient will report adherence to HEP for continued exercise benefits following cessation of PT. [] [x] [] []              Plan  L hip mobility/strength, PFM lengthening    Treatment Last 4 Visits  Treatment Day: 2       3/25/2025 4/1/2025   Pelvic Treatment   Therapeutic Exercise  Supine fig 4 stretch R/L x20 sec  Supine TFL stretch R/L x20 sec   Butterfly stretch + diaphragmatic breathing x10  Hooklying trunk rotation R/L x5   Seated figure 4 stretch           Neuro Re-Education  Worked on breathing sequence for squat mechanics   Therapeutic Activity Pt ed on findings of internal PFM assessment and on how they may be impacting symptoms.     Discussed psychosocial aspects of pelvic pain and utility of down-training PFM. Educated on pelvic floor and pelvic organ anatomy and function (with use of model). Discussed causes of constipation and strategies for mitigating symptoms including increased fiber, water, and exercise. Discussed common contributing factors to urgency, as well as strategies for improving bladder control including proper hydration, avoiding \"JIC\" urination, avoiding bladder irritants, optimizing water intake. Discussed ultimate goal of urinating 1x every 2.5-3 hours or 5-7x in a 24 hour period.    Bladder diary review and education  Ortho screen  Pt ed on findings of ortho screen and how they  may impact her symptoms   Therapeutic Exercise Minutes  15   Neuro Re-Educ Minutes  8   Therapeutic Activity Minutes 15 25   Evaluation Minutes 35    Total Time Of Timed Procedures 15 48   Total Time Of Service-Based Procedures 35 0   Total Treatment Time 50 48   HEP Bladder diary, increase water intake to 48 ounces/day, slowly work to increase fiber to 20-25g/day, double void technique Access Code: [email or web address]  URL: [email or web address]  Date: 04/01/2025  Prepared by: Silvia Mohr    Program Notes  squats: inhale down, exhale up    Exercises  - Supine Lower Trunk Rotation  - 1 x daily - 7 x weekly - 1 sets - 15 reps  - Supine Butterfly Groin Stretch  - 1 x daily - 7 x weekly - 1 sets - 2 reps - 1 min hold  - Child's Pose Stretch  - 1 x daily - 7 x weekly - 1 sets - 2 reps - 1 min hold  - Seated Piriformis Stretch with Trunk Bend  - 2-3 x daily - 7 x weekly - 1 sets - 1 reps - 30 sec - 1 min hold        HEP  Access Code: [email or web address]  URL: [email or web address]  Date: 04/01/2025  Prepared by: Silvia Mohr    Program Notes  squats: inhale down, exhale up    Exercises  - Supine Lower Trunk Rotation  - 1 x daily - 7 x weekly - 1 sets - 15 reps  - Supine Butterfly Groin Stretch  - 1 x daily - 7 x weekly - 1 sets - 2 reps - 1 min hold  - Child's Pose Stretch  - 1 x daily - 7 x weekly - 1 sets - 2 reps - 1 min hold  - Seated Piriformis Stretch with Trunk Bend  - 2-3 x daily - 7 x weekly - 1 sets - 1 reps - 30 sec - 1 min hold    Charges  1 TE, 2 TA, 1 NMR

## 2025-04-07 ENCOUNTER — TELEPHONE (OUTPATIENT)
Dept: PHYSICAL THERAPY | Facility: HOSPITAL | Age: 20
End: 2025-04-07

## 2025-04-08 ENCOUNTER — OFFICE VISIT (OUTPATIENT)
Dept: PEDIATRIC CARDIOLOGY | Age: 20
End: 2025-04-08
Attending: INTERNAL MEDICINE

## 2025-04-08 ENCOUNTER — APPOINTMENT (OUTPATIENT)
Dept: PHYSICAL THERAPY | Age: 20
End: 2025-04-08
Payer: COMMERCIAL

## 2025-04-08 VITALS
DIASTOLIC BLOOD PRESSURE: 71 MMHG | HEART RATE: 64 BPM | BODY MASS INDEX: 18.71 KG/M2 | OXYGEN SATURATION: 98 % | HEIGHT: 59 IN | WEIGHT: 92.81 LBS | SYSTOLIC BLOOD PRESSURE: 107 MMHG

## 2025-04-08 DIAGNOSIS — Q21.16 ASD (ATRIAL SEPTAL DEFECT), SINUS VENOSUS DEFECT (CMD): ICD-10-CM

## 2025-04-08 DIAGNOSIS — Q26.3 PARTIAL ANOMALOUS PULMONARY VENOUS RETURN (PAPVR) (CMD): Primary | ICD-10-CM

## 2025-04-08 PROCEDURE — G2211 COMPLEX E/M VISIT ADD ON: HCPCS | Performed by: INTERNAL MEDICINE

## 2025-04-08 PROCEDURE — 99214 OFFICE O/P EST MOD 30 MIN: CPT | Performed by: INTERNAL MEDICINE

## 2025-04-08 RX ORDER — CIPROFLOXACIN 500 MG/1
500 TABLET, FILM COATED ORAL PRN
COMMUNITY
Start: 2025-02-25

## 2025-04-08 ASSESSMENT — PAIN SCALES - GENERAL: PAINLEVEL: 0

## 2025-04-15 ENCOUNTER — OFFICE VISIT (OUTPATIENT)
Dept: PHYSICAL THERAPY | Age: 20
End: 2025-04-15
Payer: COMMERCIAL

## 2025-04-15 PROCEDURE — 97140 MANUAL THERAPY 1/> REGIONS: CPT

## 2025-04-15 PROCEDURE — 97530 THERAPEUTIC ACTIVITIES: CPT

## 2025-04-15 PROCEDURE — 97110 THERAPEUTIC EXERCISES: CPT

## 2025-04-15 NOTE — PROGRESS NOTES
Patient: Harriet Seals (19 year old, female) Referring Provider:  Insurance:   Diagnosis: Urinary urgency (R39.15) Maryjosh Merino  [employer] [place]   Date of Surgery: n/a Next MD visit:  N/A   Precautions:  None (hx of open heart surgery) none scheduled yet Referral Information:    Date of Evaluation: Req: 12, Auth: 12, Exp: 5/15/2025    03/25/25 POC Auth Visits:  12        Today's Date   4/15/2025    Subjective  Feels symptoms are more stagnant, feels urgency has increased somewhat since last visit, still getting enough water, having daily to every other day BM, but strraining more, overall getting more fruits/vegetables. Same stress as usual with school. BSS #3. Has increased caffeine slightly, but still only 1-2 caffeinated drinks per week. Started a pilates class 3x/wk.       Pain:  0/10     Objective  see chart below            Assessment  Focused session on trial of MFR to abdominal wall and lumbar paraspinal region, with dynamic cupping, and bowel massage. Pt may benefit from trial of decreased duration of runs during the week, as she is currently running 40 min 2x/wk. Pt ed on squatty potty and strategies for decreased PFM tension during BM and bladder voids. Pt will continue to benefit from skilled PT services.    Goals (to be met in 10 visits)      Not Met Progress Toward Partially Met Met   Patient will report decreased urinary frequency to <8x/day and 0x/night indicating normalizing micturition reflex for improved bladder health and function. [] [x] [] []   Patient will report ability to postpone urination for at least 10 mins after initial urge presents for improved ability to participate in community outings without fear of UI. [] [x] [] []   Patient will report increased BM frequency to at least 1x every other day indicating improved stool motility and overall bowel health. [] [x] [] []   Patient will report consumption of at least 25 grams of daily fiber and 48 ounces of water for improved stool  consistency. [] [x] [] []   Patient will report adherence to HEP for continued exercise benefits following cessation of PT. [] [x] [] []                  Plan  L hip mobility/strength, PFM lengthening    Treatment Last 4 Visits  Treatment Day: 3       3/25/2025 4/1/2025 4/15/2025   Pelvic Treatment   Therapeutic Exercise  Supine fig 4 stretch R/L x20 sec  Supine TFL stretch R/L x20 sec   Butterfly stretch + diaphragmatic breathing x10  Hooklying trunk rotation R/L x5   Seated figure 4 stretch         Reviewed pt's full exercise regimen/schedule in detail, pt ed on potential impact of PFM tension of long duration runs and frequency, made alternative recommendations to trial   Neuro Re-Education  Worked on breathing sequence for squat mechanics    Manual Therapy   MFR dynamic cupping to abdominals, all 4 quadrants, lumbar paraspinals  Bowel massage   Therapeutic Activity Pt ed on findings of internal PFM assessment and on how they may be impacting symptoms.     Discussed psychosocial aspects of pelvic pain and utility of down-training PFM. Educated on pelvic floor and pelvic organ anatomy and function (with use of model). Discussed causes of constipation and strategies for mitigating symptoms including increased fiber, water, and exercise. Discussed common contributing factors to urgency, as well as strategies for improving bladder control including proper hydration, avoiding \"JIC\" urination, avoiding bladder irritants, optimizing water intake. Discussed ultimate goal of urinating 1x every 2.5-3 hours or 5-7x in a 24 hour period.    Bladder diary review and education  Ortho screen  Pt ed on findings of ortho screen and how they may impact her symptoms Pt ed on squatty potty and void and evacuation mechanics   Therapeutic Exercise Minutes  15 10   Neuro Re-Educ Minutes  8    Manual Therapy Minutes   25   Therapeutic Activity Minutes 15 25 10   Evaluation Minutes 35     Total Time Of Timed Procedures 15 48 45   Total  Time Of Service-Based Procedures 35 0 0   Total Treatment Time 50 48 45   HEP Bladder diary, increase water intake to 48 ounces/day, slowly work to increase fiber to 20-25g/day, double void technique Access Code: [email or web address]  URL: [email or web address]  Date: 04/01/2025  Prepared by: Silvia Mohr    Program Notes  squats: inhale down, exhale up    Exercises  - Supine Lower Trunk Rotation  - 1 x daily - 7 x weekly - 1 sets - 15 reps  - Supine Butterfly Groin Stretch  - 1 x daily - 7 x weekly - 1 sets - 2 reps - 1 min hold  - Child's Pose Stretch  - 1 x daily - 7 x weekly - 1 sets - 2 reps - 1 min hold  - Seated Piriformis Stretch with Trunk Bend  - 2-3 x daily - 7 x weekly - 1 sets - 1 reps - 30 sec - 1 min hold         HEP  Access Code: [email or web address]  URL: [email or web address]  Date: 04/01/2025  Prepared by: Silvia Mohr    Program Notes  squats: inhale down, exhale up    Exercises  - Supine Lower Trunk Rotation  - 1 x daily - 7 x weekly - 1 sets - 15 reps  - Supine Butterfly Groin Stretch  - 1 x daily - 7 x weekly - 1 sets - 2 reps - 1 min hold  - Child's Pose Stretch  - 1 x daily - 7 x weekly - 1 sets - 2 reps - 1 min hold  - Seated Piriformis Stretch with Trunk Bend  - 2-3 x daily - 7 x weekly - 1 sets - 1 reps - 30 sec - 1 min hold    Charges  1 TE, 1 TA, 1 Man

## 2025-04-22 ENCOUNTER — OFFICE VISIT (OUTPATIENT)
Dept: PHYSICAL THERAPY | Age: 20
End: 2025-04-22
Payer: COMMERCIAL

## 2025-04-22 PROCEDURE — 97112 NEUROMUSCULAR REEDUCATION: CPT

## 2025-04-22 PROCEDURE — 97110 THERAPEUTIC EXERCISES: CPT

## 2025-04-22 PROCEDURE — 97140 MANUAL THERAPY 1/> REGIONS: CPT

## 2025-04-24 NOTE — PROGRESS NOTES
Patient: Harriet Seals (19 year old, female) Referring Provider:  Insurance:   Diagnosis: Urinary urgency (R39.15) Mary Angelique  [employer] [place]   Date of Surgery: n/a Next MD visit:  N/A   Precautions:  None (hx of open heart surgery) none scheduled yet Referral Information:    Date of Evaluation: Req: 12, Auth: 12, Exp: 5/15/2025    03/25/25 POC Auth Visits:  12        Today's Date   4/22/2025    Subjective  Pt felt a little better after last week, not sure if it was cupping or hydration. Still straining, sometimes occasional painful BM. Still getting in fruits and veggies, greek yogurt, etc. BSS 2/3.       Pain: 0/10     Objective  see chart below         Assessment  Continued with MFR cupping this session to full abdominal wall, and bowel massage. Pt ed on BM medchanics and breathing, and will benefit from trial of implenting techniques this week.    Goals (to be met in 10 visits)      Not Met Progress Toward Partially Met Met   Patient will report decreased urinary frequency to <8x/day and 0x/night indicating normalizing micturition reflex for improved bladder health and function. [] [x] [] []   Patient will report ability to postpone urination for at least 10 mins after initial urge presents for improved ability to participate in community outings without fear of UI. [] [x] [] []   Patient will report increased BM frequency to at least 1x every other day indicating improved stool motility and overall bowel health. [] [x] [] []   Patient will report consumption of at least 25 grams of daily fiber and 48 ounces of water for improved stool consistency. [] [x] [] []   Patient will report adherence to HEP for continued exercise benefits following cessation of PT. [] [x] [] []                      Plan  L hip mobility/strength, PFM lengthening, MFR cupping    Treatment Last 4 Visits  Treatment Day: 4       3/25/2025 4/1/2025 4/15/2025 4/22/2025   Pelvic Treatment   Therapeutic Exercise  Supine fig 4 stretch R/L x20  sec  Supine TFL stretch R/L x20 sec   Butterfly stretch + diaphragmatic breathing x10  Hooklying trunk rotation R/L x5   Seated figure 4 stretch         Reviewed pt's full exercise regimen/schedule in detail, pt ed on potential impact of PFM tension of long duration runs and frequency, made alternative recommendations to trial Cat cow  Happy baby  Deep squat    Neuro Re-Education  Worked on breathing sequence for squat mechanics  Pt ed on breathing techniques for increased PFM relaxation and TA activation for BM evacuation   Manual Therapy   MFR dynamic cupping to abdominals, all 4 quadrants, lumbar paraspinals  Bowel massage MFR dynamic cupping to abdominals, all 4 quadrants, lumbar paraspinals   Bowel massage      Therapeutic Activity Pt ed on findings of internal PFM assessment and on how they may be impacting symptoms.     Discussed psychosocial aspects of pelvic pain and utility of down-training PFM. Educated on pelvic floor and pelvic organ anatomy and function (with use of model). Discussed causes of constipation and strategies for mitigating symptoms including increased fiber, water, and exercise. Discussed common contributing factors to urgency, as well as strategies for improving bladder control including proper hydration, avoiding \"JIC\" urination, avoiding bladder irritants, optimizing water intake. Discussed ultimate goal of urinating 1x every 2.5-3 hours or 5-7x in a 24 hour period.    Bladder diary review and education  Ortho screen  Pt ed on findings of ortho screen and how they may impact her symptoms Pt ed on squatty potty and void and evacuation mechanics    Therapeutic Exercise Minutes  15 10 10   Neuro Re-Educ Minutes  8  10   Manual Therapy Minutes   25 20   Therapeutic Activity Minutes 15 25 10    Evaluation Minutes 35      Total Time Of Timed Procedures 15 48 45 40   Total Time Of Service-Based Procedures 35 0 0 0   Total Treatment Time 50 48 45 40   HEP Bladder diary, increase water intake to  48 ounces/day, slowly work to increase fiber to 20-25g/day, double void technique Access Code: [email or web address]  URL: [email or web address]  Date: 04/01/2025  Prepared by: Silvia oMhr    Program Notes  squats: inhale down, exhale up    Exercises  - Supine Lower Trunk Rotation  - 1 x daily - 7 x weekly - 1 sets - 15 reps  - Supine Butterfly Groin Stretch  - 1 x daily - 7 x weekly - 1 sets - 2 reps - 1 min hold  - Child's Pose Stretch  - 1 x daily - 7 x weekly - 1 sets - 2 reps - 1 min hold  - Seated Piriformis Stretch with Trunk Bend  - 2-3 x daily - 7 x weekly - 1 sets - 1 reps - 30 sec - 1 min hold  Access Code: [email or web address]  URL: [email or web address]  Date: 04/22/2025  Prepared by: Silvia Mohr    Program Notes  squats: inhale down, exhale up    Exercises  - Supine Lower Trunk Rotation  - 1 x daily - 7 x weekly - 1 sets - 15 reps  - Supine Butterfly Groin Stretch  - 1 x daily - 7 x weekly - 1 sets - 2 reps - 1 min hold  - Child's Pose Stretch  - 1 x daily - 7 x weekly - 1 sets - 2 reps - 1 min hold  - Seated Piriformis Stretch with Trunk Bend  - 2-3 x daily - 7 x weekly - 1 sets - 1 reps - 30 sec - 1 min hold  - Cat Cow  - 1 x daily - 7 x weekly - 1 sets - 10 reps  - Happy Baby with Pelvic Floor Lengthening  - 1 x daily - 7 x weekly - 1 sets - 10 reps  - Deep Squat with Pelvic Floor Relaxation  - 1 x daily - 7 x weekly - 1 sets - 10 reps        HEP  Access Code: [email or web address]  URL: [email or web address]  Date: 04/22/2025  Prepared by: Silvia Mohr    Program Notes  squats: inhale down, exhale up    Exercises  - Supine Lower Trunk Rotation  - 1 x daily - 7 x weekly - 1 sets - 15 reps  - Supine Butterfly Groin Stretch  - 1 x daily - 7 x weekly - 1 sets - 2 reps - 1 min hold  - Child's Pose Stretch  - 1 x daily - 7 x weekly - 1 sets - 2 reps - 1 min hold  - Seated Piriformis Stretch with Trunk Bend  - 2-3 x daily - 7 x weekly - 1 sets - 1 reps - 30 sec - 1 min hold  - Cat Cow  - 1  x daily - 7 x weekly - 1 sets - 10 reps  - Happy Baby with Pelvic Floor Lengthening  - 1 x daily - 7 x weekly - 1 sets - 10 reps  - Deep Squat with Pelvic Floor Relaxation  - 1 x daily - 7 x weekly - 1 sets - 10 reps    Charges  1 TE, 1 NMR, 1 Man

## 2025-04-29 ENCOUNTER — OFFICE VISIT (OUTPATIENT)
Dept: PHYSICAL THERAPY | Age: 20
End: 2025-04-29
Payer: COMMERCIAL

## 2025-04-29 PROCEDURE — 97530 THERAPEUTIC ACTIVITIES: CPT

## 2025-04-29 PROCEDURE — 97110 THERAPEUTIC EXERCISES: CPT

## 2025-04-29 NOTE — PROGRESS NOTES
Patient: Harriet Seals (19 year old, female) Referring Provider:  Insurance:   Diagnosis: Urinary urgency (R39.15) Mary Angelique  [employer] [place]   Date of Surgery: n/a Next MD visit:  N/A   Precautions:  None (hx of open heart surgery) none scheduled yet Referral Information:    Date of Evaluation: Req: 12, Auth: 12, Exp: 5/15/2025    03/25/25 POC Auth Visits:  12        Today's Date   4/29/2025    Subjective  pt is having BM daily now, BSS #4 consistently, doing 1/4 cap miralax at night over the past week, feels like that is helping, doing breathing technique with BM that is helping, does not like using the shoeboxes to lift feet up off floor. having more urinary urgency now, feeling like she needs to go every 2 hours and sometimes is delaying. Drinking 48 ounces water plus a matcha plus a redbull       Pain: 0/10 (some back pain in mornings 7/10 with pilates)     Objective  see flowsheet below       Assessment  Focused session on recommended modifications to bowel/bladder habits as listed above, working to maintain BM improvements while decreasing increased bladder urgency pt is experiencing over past week. Worked on core strengthening in more neutral spine positions with less strain on low back, with shortened level arms and decreased spinal flexion, with less focus on strengthening RA and more focus on TA. Pt making significant progress overall.    Goals (to be met in 10 visits)      Not Met Progress Toward Partially Met Met   Patient will report decreased urinary frequency to <8x/day and 0x/night indicating normalizing micturition reflex for improved bladder health and function. [] [x] [] []   Patient will report ability to postpone urination for at least 10 mins after initial urge presents for improved ability to participate in community outings without fear of UI. [] [x] [] []   Patient will report increased BM frequency to at least 1x every other day indicating improved stool motility and overall bowel health.  [] [x] [] []   Patient will report consumption of at least 25 grams of daily fiber and 48 ounces of water for improved stool consistency. [] [x] [] []   Patient will report adherence to HEP for continued exercise benefits following cessation of PT. [] [x] [] []                          Plan  L hip mobility/strength, PFM lengthening, core strengthening    Treatment Last 4 Visits  Treatment Day: 5       4/1/2025 4/15/2025 4/22/2025 4/29/2025   Pelvic Treatment   Therapeutic Exercise Supine fig 4 stretch R/L x20 sec  Supine TFL stretch R/L x20 sec   Butterfly stretch + diaphragmatic breathing x10  Hooklying trunk rotation R/L x5   Seated figure 4 stretch         Reviewed pt's full exercise regimen/schedule in detail, pt ed on potential impact of PFM tension of long duration runs and frequency, made alternative recommendations to trial Cat cow  Happy baby  Deep squat  Bridges x10  Pelvic tilts x10  Bridge roll ups x10  Table top toe taps alt x10  Quadruped hover + exhale x5, with yoga block hip add x5  Stand B sh ext TA activation + exhale GTB x10  Supine scissor kicks x10  Pt ed on modifications for abdominal exercises she is completing when she works out at the gym, to decreased back pain  Reviewed HEP and modifications   Neuro Re-Education Worked on breathing sequence for squat mechanics  Pt ed on breathing techniques for increased PFM relaxation and TA activation for BM evacuation    Manual Therapy  MFR dynamic cupping to abdominals, all 4 quadrants, lumbar paraspinals  Bowel massage MFR dynamic cupping to abdominals, all 4 quadrants, lumbar paraspinals   Bowel massage       Therapeutic Activity Bladder diary review and education  Ortho screen  Pt ed on findings of ortho screen and how they may impact her symptoms Pt ed on squatty potty and void and evacuation mechanics  Reviewed bladder/bowel habit modification plan for next week: continue with evening miralax, continue with breathing techniques for BMs, decrease  water intake to 40 ounces per day, complete 2 days of bladder diary   Therapeutic Exercise Minutes 15 10 10 35   Neuro Re-Educ Minutes 8  10    Manual Therapy Minutes  25 20    Therapeutic Activity Minutes 25 10  10   Total Time Of Timed Procedures 48 45 40 45   Total Time Of Service-Based Procedures 0 0 0 0   Total Treatment Time 48 45 40 45   HEP Access Code: [email or web address]  URL: [email or web address]  Date: 04/01/2025  Prepared by: Silvia Mohr    Program Notes  squats: inhale down, exhale up    Exercises  - Supine Lower Trunk Rotation  - 1 x daily - 7 x weekly - 1 sets - 15 reps  - Supine Butterfly Groin Stretch  - 1 x daily - 7 x weekly - 1 sets - 2 reps - 1 min hold  - Child's Pose Stretch  - 1 x daily - 7 x weekly - 1 sets - 2 reps - 1 min hold  - Seated Piriformis Stretch with Trunk Bend  - 2-3 x daily - 7 x weekly - 1 sets - 1 reps - 30 sec - 1 min hold  Access Code: [email or web address]  URL: https://[email or web address]/  Date: 04/22/2025  Prepared by: Silvia Mohr    Program Notes  squats: inhale down, exhale up    Exercises  - Supine Lower Trunk Rotation  - 1 x daily - 7 x weekly - 1 sets - 15 reps  - Supine Butterfly Groin Stretch  - 1 x daily - 7 x weekly - 1 sets - 2 reps - 1 min hold  - Child's Pose Stretch  - 1 x daily - 7 x weekly - 1 sets - 2 reps - 1 min hold  - Seated Piriformis Stretch with Trunk Bend  - 2-3 x daily - 7 x weekly - 1 sets - 1 reps - 30 sec - 1 min hold  - Cat Cow  - 1 x daily - 7 x weekly - 1 sets - 10 reps  - Happy Baby with Pelvic Floor Lengthening  - 1 x daily - 7 x weekly - 1 sets - 10 reps  - Deep Squat with Pelvic Floor Relaxation  - 1 x daily - 7 x weekly - 1 sets - 10 reps Access Code: [email or web address]  URL: https://[email or web address]/  Date: 04/29/2025  Prepared by: Silvia Mohr    Program Notes  squats: inhale down, exhale up    Exercises  - Supine Lower Trunk Rotation  - 1 x daily - 7 x weekly - 1 sets - 15 reps  - Supine Butterfly Groin Stretch  - 1 x  daily - 7 x weekly - 1 sets - 2 reps - 1 min hold  - Child's Pose Stretch  - 1 x daily - 7 x weekly - 1 sets - 2 reps - 1 min hold  - Seated Piriformis Stretch with Trunk Bend  - 2-3 x daily - 7 x weekly - 1 sets - 1 reps - 30 sec - 1 min hold  - Bear Plank from Quadruped  - 1 x daily - 3 x weekly - 2 sets - 10 reps  - Shoulder Extension with Resistance  - 1 x daily - 3 x weekly - 2 sets - 10 reps        HEP  Access Code: [email or web address]  URL: [email or web address]  Date: 04/29/2025  Prepared by: Silvia Mohr    Program Notes  squats: inhale down, exhale up    Exercises  - Supine Lower Trunk Rotation  - 1 x daily - 7 x weekly - 1 sets - 15 reps  - Supine Butterfly Groin Stretch  - 1 x daily - 7 x weekly - 1 sets - 2 reps - 1 min hold  - Child's Pose Stretch  - 1 x daily - 7 x weekly - 1 sets - 2 reps - 1 min hold  - Seated Piriformis Stretch with Trunk Bend  - 2-3 x daily - 7 x weekly - 1 sets - 1 reps - 30 sec - 1 min hold  - Bear Plank from Quadruped  - 1 x daily - 3 x weekly - 2 sets - 10 reps  - Shoulder Extension with Resistance  - 1 x daily - 3 x weekly - 2 sets - 10 reps    Charges  2 TE, 1 TA

## 2025-05-06 ENCOUNTER — OFFICE VISIT (OUTPATIENT)
Dept: PHYSICAL THERAPY | Age: 20
End: 2025-05-06
Payer: COMMERCIAL

## 2025-05-06 PROCEDURE — 97530 THERAPEUTIC ACTIVITIES: CPT

## 2025-05-06 NOTE — PROGRESS NOTES
Patient: Harriet Seals (19 year old, female) Referring Provider:  Insurance:   Diagnosis: Urinary urgency (R39.15) Mary Merino  [employer] [place]   Date of Surgery: n/a Next MD visit:  N/A   Precautions:  None (hx of open heart surgery) none scheduled yet Referral Information:    Date of Evaluation: Req: 12, Auth: 12, Exp: 5/15/2025    03/25/25 POC Auth Visits:  12        Today's Date   5/6/2025    Subjective  Urgency has been constant and there some days, and other days no urge at all. still doing miralax 1tsp per day, only had 3-4 BMs this week, BSS #3/4/5 feels like she is going less amount than before. Did bladder diary for a school day.       Pain: 0/10     Objective  see flowsheet below       Bladder diary review: school day  7 voids, normal intervals between 2-4 hours, 8-12 sec duration, 48 oz water, 3 meals     Assessment  Pt progressing well, voiding within normal ranges and normal duration of void. Few strong urges were at times of finishing a test, studying late at night, and waking up in the morning. Pt is making progress with significantly decreased urinary urgency, no pain with penetration, decreased back pain after exercise. Pt will benefit from decreased freq of visits to every other week to allow for trial of progression to indep symptom management.    Goals (to be met in 10 visits)      Not Met Progress Toward Partially Met Met   Patient will report decreased urinary frequency to <8x/day and 0x/night indicating normalizing micturition reflex for improved bladder health and function. [] [] [] [x]   Patient will report ability to postpone urination for at least 10 mins after initial urge presents for improved ability to participate in community outings without fear of UI. [] [] [] [x]   Patient will report increased BM frequency to at least 1x every other day indicating improved stool motility and overall bowel health. [] [] [x] []   Patient will report consumption of at least 25 grams of daily fiber  and 48 ounces of water for improved stool consistency. [] [] [x] []   Patient will report adherence to HEP for continued exercise benefits following cessation of PT. [] [] [x] []          Plan  working on weaning off miralax, MFR, abdominal massage, review bladder/bowel diary    Treatment Last 4 Visits  Treatment Day: 6       4/15/2025 4/22/2025 4/29/2025 5/6/2025   Pelvic Treatment   Therapeutic Exercise Reviewed pt's full exercise regimen/schedule in detail, pt ed on potential impact of PFM tension of long duration runs and frequency, made alternative recommendations to trial Cat cow  Happy baby  Deep squat  Bridges x10  Pelvic tilts x10  Bridge roll ups x10  Table top toe taps alt x10  Quadruped hover + exhale x5, with yoga block hip add x5  Stand B sh ext TA activation + exhale GTB x10  Supine scissor kicks x10  Pt ed on modifications for abdominal exercises she is completing when she works out at the gym, to decreased back pain  Reviewed HEP and modifications    Neuro Re-Education  Pt ed on breathing techniques for increased PFM relaxation and TA activation for BM evacuation     Manual Therapy MFR dynamic cupping to abdominals, all 4 quadrants, lumbar paraspinals  Bowel massage MFR dynamic cupping to abdominals, all 4 quadrants, lumbar paraspinals   Bowel massage        Therapeutic Activity Pt ed on squatty potty and void and evacuation mechanics  Reviewed bladder/bowel habit modification plan for next week: continue with evening miralax, continue with breathing techniques for BMs, decrease water intake to 40 ounces per day, complete 2 days of bladder diary Reviewed bladder diary  Reviewed bladder habit and modifications recommended  Reviewed bowel habits and modifications recommended   Therapeutic Exercise Minutes 10 10 35    Neuro Re-Educ Minutes  10     Manual Therapy Minutes 25 20     Therapeutic Activity Minutes 10  10 45   Total Time Of Timed Procedures 45 40 45 45   Total Time Of Service-Based Procedures  0 0 0 0   Total Treatment Time 45 40 45 45   HEP  Access Code: [email or web address]  URL: [email or web address]  Date: 04/22/2025  Prepared by: Silvia Mohr    Program Notes  squats: inhale down, exhale up    Exercises  - Supine Lower Trunk Rotation  - 1 x daily - 7 x weekly - 1 sets - 15 reps  - Supine Butterfly Groin Stretch  - 1 x daily - 7 x weekly - 1 sets - 2 reps - 1 min hold  - Child's Pose Stretch  - 1 x daily - 7 x weekly - 1 sets - 2 reps - 1 min hold  - Seated Piriformis Stretch with Trunk Bend  - 2-3 x daily - 7 x weekly - 1 sets - 1 reps - 30 sec - 1 min hold  - Cat Cow  - 1 x daily - 7 x weekly - 1 sets - 10 reps  - Happy Baby with Pelvic Floor Lengthening  - 1 x daily - 7 x weekly - 1 sets - 10 reps  - Deep Squat with Pelvic Floor Relaxation  - 1 x daily - 7 x weekly - 1 sets - 10 reps Access Code: [email or web address]  URL: [email or web address]  Date: 04/29/2025  Prepared by: [employer]    Program Notes  squats: inhale down, exhale up    Exercises  - Supine Lower Trunk Rotation  - 1 x daily - 7 x weekly - 1 sets - 15 reps  - Supine Butterfly Groin Stretch  - 1 x daily - 7 x weekly - 1 sets - 2 reps - 1 min hold  - Child's Pose Stretch  - 1 x daily - 7 x weekly - 1 sets - 2 reps - 1 min hold  - Seated Piriformis Stretch with Trunk Bend  - 2-3 x daily - 7 x weekly - 1 sets - 1 reps - 30 sec - 1 min hold  - Bear Plank from Quadruped  - 1 x daily - 3 x weekly - 2 sets - 10 reps  - Shoulder Extension with Resistance  - 1 x daily - 3 x weekly - 2 sets - 10 reps Bladder/bowel diary in 1 week        HEP  Bladder/bowel diary in 1 week, decrease miralax to 1/2 tspn daily    Charges  3 TA

## 2025-05-13 ENCOUNTER — APPOINTMENT (OUTPATIENT)
Dept: PHYSICAL THERAPY | Age: 20
End: 2025-05-13
Payer: COMMERCIAL

## 2025-05-20 ENCOUNTER — OFFICE VISIT (OUTPATIENT)
Dept: PHYSICAL THERAPY | Age: 20
End: 2025-05-20
Payer: COMMERCIAL

## 2025-05-20 PROCEDURE — 97110 THERAPEUTIC EXERCISES: CPT

## 2025-05-20 PROCEDURE — 97530 THERAPEUTIC ACTIVITIES: CPT

## 2025-05-20 NOTE — PROGRESS NOTES
Patient: Harriet Seals (19 year old, female) Referring Provider:  Insurance:   Diagnosis: Urinary urgency (R39.15) Mary Angelique  [employer] [place]   Date of Surgery: n/a Next MD visit:  N/A   Precautions:  None (hx of open heart surgery) none scheduled yet Referral Information:    Date of Evaluation: Req: 12, Auth: 12, Exp: 5/15/2025    03/25/25 POC Auth Visits:  12        Today's Date   5/20/2025    Subjective  In finals week so stress is higher. Eating more fiber overall, more fruits. Water has decreased some. No pain with urination or BMs. Abdominal pain has increased. Using miralax 1 tbspn more as needed now. Is having BMs about every other day, no more straining.       Pain: 3/10     Objective  see flowsheet below       Assessment  Pt with some fluctuation of symptoms over the past 2 weeks since last visit, could be due to incrased stress with finals, and decreased overall water intake. Pelvic floor muscle related pain is not present, however B abdominal pain has returned, and pt feels it increases with palpation. Pt reports she has occasionally missed periods prior, and has missed 2 periods now, unsure if it is caused by stress. Discussed recommendation to follow up with ref provider or gynecologist reguarding missed periods. Pt requesting and is appropriate to transition POC to visits every other week to work on progression toward indep symptom management. Focused this session on thoracic, lumbopelvic and hip mobility and stretches to manage abdominal pain while studying and completing finals this week.     Goals (to be met in 10 visits)      Not Met Progress Toward Partially Met Met   Patient will report decreased urinary frequency to <8x/day and 0x/night indicating normalizing micturition reflex for improved bladder health and function. [] [] [] [x]   Patient will report ability to postpone urination for at least 10 mins after initial urge presents for improved ability to participate in community outings  without fear of UI. [] [] [] [x]   Patient will report increased BM frequency to at least 1x every other day indicating improved stool motility and overall bowel health. [] [] [x] []   Patient will report consumption of at least 25 grams of daily fiber and 48 ounces of water for improved stool consistency. [] [] [x] []   Patient will report adherence to HEP for continued exercise benefits following cessation of PT. [] [] [x] []              Plan  working on weaning off miralax, MFR, abdominal massage    Treatment Last 4 Visits  Treatment Day: 7 4/22/2025 4/29/2025 5/6/2025 5/20/2025   Pelvic Treatment   Therapeutic Exercise Cat cow  Happy baby  Deep squat  Bridges x10  Pelvic tilts x10  Bridge roll ups x10  Table top toe taps alt x10  Quadruped hover + exhale x5, with yoga block hip add x5  Stand B sh ext TA activation + exhale GTB x10  Supine scissor kicks x10  Pt ed on modifications for abdominal exercises she is completing when she works out at the gym, to decreased back pain  Reviewed HEP and modifications  Doorway stretch 10\" x5  Seated pelvic tilts x10  Seated fig 4 R/L x10  Seated resisted ER RTB x10  Supine resisted ER RTB x10  Rainer stretch R/L x1 min  Cobra pose + DB x1 min     Neuro Re-Education Pt ed on breathing techniques for increased PFM relaxation and TA activation for BM evacuation      Manual Therapy MFR dynamic cupping to abdominals, all 4 quadrants, lumbar paraspinals   Bowel massage         Therapeutic Activity  Reviewed bladder/bowel habit modification plan for next week: continue with evening miralax, continue with breathing techniques for BMs, decrease water intake to 40 ounces per day, complete 2 days of bladder diary Reviewed bladder diary  Reviewed bladder habit and modifications recommended  Reviewed bowel habits and modifications recommended Reviewed importance of maintaining healthy bladder habits in times of increased stress, with focus on: hydration, diaphragmatic breathing,  stretching   Therapeutic Exercise Minutes 10 35  25   Neuro Re-Educ Minutes 10      Manual Therapy Minutes 20      Therapeutic Activity Minutes  10 45 15   Total Time Of Timed Procedures 40 45 45 40   Total Time Of Service-Based Procedures 0 0 0 0   Total Treatment Time 40 45 45 40   HEP Access Code: [email or web address]  URL: [email or web address]  Date: 04/22/2025  Prepared by: Silvia Mohr    Program Notes  squats: inhale down, exhale up    Exercises  - Supine Lower Trunk Rotation  - 1 x daily - 7 x weekly - 1 sets - 15 reps  - Supine Butterfly Groin Stretch  - 1 x daily - 7 x weekly - 1 sets - 2 reps - 1 min hold  - Child's Pose Stretch  - 1 x daily - 7 x weekly - 1 sets - 2 reps - 1 min hold  - Seated Piriformis Stretch with Trunk Bend  - 2-3 x daily - 7 x weekly - 1 sets - 1 reps - 30 sec - 1 min hold  - Cat Cow  - 1 x daily - 7 x weekly - 1 sets - 10 reps  - Happy Baby with Pelvic Floor Lengthening  - 1 x daily - 7 x weekly - 1 sets - 10 reps  - Deep Squat with Pelvic Floor Relaxation  - 1 x daily - 7 x weekly - 1 sets - 10 reps Access Code: [email or web address]  URL: [email or web address]  Date: 04/29/2025  Prepared by: Silvia PiperScoutcristi    Program Notes  squats: inhale down, exhale up    Exercises  - Supine Lower Trunk Rotation  - 1 x daily - 7 x weekly - 1 sets - 15 reps  - Supine Butterfly Groin Stretch  - 1 x daily - 7 x weekly - 1 sets - 2 reps - 1 min hold  - Child's Pose Stretch  - 1 x daily - 7 x weekly - 1 sets - 2 reps - 1 min hold  - Seated Piriformis Stretch with Trunk Bend  - 2-3 x daily - 7 x weekly - 1 sets - 1 reps - 30 sec - 1 min hold  - Bear Plank from Quadruped  - 1 x daily - 3 x weekly - 2 sets - 10 reps  - Shoulder Extension with Resistance  - 1 x daily - 3 x weekly - 2 sets - 10 reps No changes Added:  - Doorway Pec Stretch at 90 Degrees Abduction  - 1 x daily - 7 x weekly - 1 sets - 10 reps  - Seated Pelvic Tilt  - 1 x daily - 7 x weekly - 1 sets - 10 reps  - Seated Piriformis  Stretch with Trunk Bend  - 1 x daily - 7 x weekly - 1 sets - 3 reps - 30 sec hold  - Shoulder External Rotation and Scapular Retraction with Resistance  - 1 x daily - 7 x weekly - 1 sets - 10 reps  - Rainer Stretch  - 1 x daily - 7 x weekly - 1 sets - 1-3 reps - 1 min hold        HEP  Added:  - Doorway Pec Stretch at 90 Degrees Abduction  - 1 x daily - 7 x weekly - 1 sets - 10 reps  - Seated Pelvic Tilt  - 1 x daily - 7 x weekly - 1 sets - 10 reps  - Seated Piriformis Stretch with Trunk Bend  - 1 x daily - 7 x weekly - 1 sets - 3 reps - 30 sec hold  - Shoulder External Rotation and Scapular Retraction with Resistance  - 1 x daily - 7 x weekly - 1 sets - 10 reps  - Rainer Stretch  - 1 x daily - 7 x weekly - 1 sets - 1-3 reps - 1 min hold    Charges  1 TA, 2 TE

## 2025-05-27 ENCOUNTER — APPOINTMENT (OUTPATIENT)
Dept: PHYSICAL THERAPY | Age: 20
End: 2025-05-27
Payer: COMMERCIAL

## 2025-06-03 ENCOUNTER — OFFICE VISIT (OUTPATIENT)
Dept: PHYSICAL THERAPY | Age: 20
End: 2025-06-03
Payer: COMMERCIAL

## 2025-06-03 PROCEDURE — 97110 THERAPEUTIC EXERCISES: CPT

## 2025-06-03 PROCEDURE — 97140 MANUAL THERAPY 1/> REGIONS: CPT

## 2025-06-03 PROCEDURE — 97530 THERAPEUTIC ACTIVITIES: CPT

## 2025-06-03 NOTE — PROGRESS NOTES
Patient: Harriet Seals (19 year old, female) Referring Provider:  Insurance:   Diagnosis: Urinary urgency (R39.15) Mary Angelique  [employer] [place]   Date of Surgery: n/a Next MD visit:  N/A   Precautions:  None (hx of open heart surgery) none scheduled yet Referral Information:    Date of Evaluation: Req: 12, Auth: 12, Exp: 5/15/2025    03/25/25 POC Auth Visits:  12        Today's Date   6/3/2025    Subjective  Pt feels like symptoms are a little better, did finally get her period, so feels like the delay is related to stress. Felt really bloated from around the time of last appt, until she got her period.       Pain: 0/10     Objective  see flowsheet below       Bladder urgency and pressure with palpation to R ant PFM region     Assessment  Pt will benefit from decreased miralax to 1tsp every other day to continue with managing bowel regularity. Many of pt's symptoms have improved after decrease in stress with finishing the school semester. Pt's symptoms seem to be highly correlated with stress and hormone fluctuations. Pt initially with increased urgency and pressure with palpation at R ischiocavernosus and R urethrovaginalis, and then with significantly reduced urgency with palpation after MFR techniques to perineum, transverse perineals, and R anterior PFMs. Pt may continue to benefit from self-MFR techniques at home, pt ed this session and added to HEP. Pt will also benefit from decreased freq of PT visits to every 2-3 weeks to progress toward indep symptom management.     Goals (to be met in 10 visits)      Not Met Progress Toward Partially Met Met   Patient will report decreased urinary frequency to <8x/day and 0x/night indicating normalizing micturition reflex for improved bladder health and function. [] [] [] [x]   Patient will report ability to postpone urination for at least 10 mins after initial urge presents for improved ability to participate in community outings without fear of UI. [] [] [] [x]   Patient  will report increased BM frequency to at least 1x every other day indicating improved stool motility and overall bowel health. [] [] [x] []   Patient will report consumption of at least 25 grams of daily fiber and 48 ounces of water for improved stool consistency. [] [] [x] []   Patient will report adherence to HEP for continued exercise benefits following cessation of PT. [] [] [x] []        Plan  working on weaning off miralax, PFM MFR internal/external, lower abdominal MFR    Treatment Last 4 Visits  Treatment Day: 8       4/29/2025 5/6/2025 5/20/2025 6/3/2025   Pelvic Treatment   Therapeutic Exercise Bridges x10  Pelvic tilts x10  Bridge roll ups x10  Table top toe taps alt x10  Quadruped hover + exhale x5, with yoga block hip add x5  Stand B sh ext TA activation + exhale GTB x10  Supine scissor kicks x10  Pt ed on modifications for abdominal exercises she is completing when she works out at the gym, to decreased back pain  Reviewed HEP and modifications  Doorway stretch 10\" x5  Seated pelvic tilts x10  Seated fig 4 R/L x10  Seated resisted ER RTB x10  Supine resisted ER RTB x10  Rainer stretch R/L x1 min  Cobra pose + DB x1 min   Reviewed HEP   Manual Therapy    Informed consent for internal pelvic assessment/treatment given: YES    Internal PFM MFR, focus on R side         Therapeutic Activity Reviewed bladder/bowel habit modification plan for next week: continue with evening miralax, continue with breathing techniques for BMs, decrease water intake to 40 ounces per day, complete 2 days of bladder diary Reviewed bladder diary  Reviewed bladder habit and modifications recommended  Reviewed bowel habits and modifications recommended Reviewed importance of maintaining healthy bladder habits in times of increased stress, with focus on: hydration, diaphragmatic breathing, stretching Reviewed bladder/bowel healthy habits  Reviewed modified use of miralax to continue bowel regularity  Internal PFM re-assessment  Pt  ed on rec for decreased frequency of PT sessions to every 2-3 weeks to progress toward indep symptom management     Therapeutic Exercise Minutes 35  25 8   Manual Therapy Minutes    15   Therapeutic Activity Minutes 10 45 15 20   Other Therapy Minutes    4   Total Time Of Timed Procedures 45 45 40 43   Total Time Of Service-Based Procedures 0 0 0 4   Total Treatment Time 45 45 40 47   HEP Access Code: [email or web address]  URL: [email or web address]  Date: 04/29/2025  Prepared by: Silvia Mohr    Program Notes  squats: inhale down, exhale up    Exercises  - Supine Lower Trunk Rotation  - 1 x daily - 7 x weekly - 1 sets - 15 reps  - Supine Butterfly Groin Stretch  - 1 x daily - 7 x weekly - 1 sets - 2 reps - 1 min hold  - Child's Pose Stretch  - 1 x daily - 7 x weekly - 1 sets - 2 reps - 1 min hold  - Seated Piriformis Stretch with Trunk Bend  - 2-3 x daily - 7 x weekly - 1 sets - 1 reps - 30 sec - 1 min hold  - Bear Plank from Quadruped  - 1 x daily - 3 x weekly - 2 sets - 10 reps  - Shoulder Extension with Resistance  - 1 x daily - 3 x weekly - 2 sets - 10 reps No changes Added:  - Doorway Pec Stretch at 90 Degrees Abduction  - 1 x daily - 7 x weekly - 1 sets - 10 reps  - Seated Pelvic Tilt  - 1 x daily - 7 x weekly - 1 sets - 10 reps  - Seated Piriformis Stretch with Trunk Bend  - 1 x daily - 7 x weekly - 1 sets - 3 reps - 30 sec hold  - Shoulder External Rotation and Scapular Retraction with Resistance  - 1 x daily - 7 x weekly - 1 sets - 10 reps  - Rainer Stretch  - 1 x daily - 7 x weekly - 1 sets - 1-3 reps - 1 min hold Perineal stretching, every other day        HEP  Perineal stretching, every other day    Charges  1 TE, 1 TA, 1 Man

## 2025-07-01 ENCOUNTER — OFFICE VISIT (OUTPATIENT)
Dept: PHYSICAL THERAPY | Age: 20
End: 2025-07-01
Payer: COMMERCIAL

## 2025-07-01 ENCOUNTER — APPOINTMENT (OUTPATIENT)
Dept: PHYSICAL THERAPY | Age: 20
End: 2025-07-01
Payer: COMMERCIAL

## 2025-07-01 PROCEDURE — 97530 THERAPEUTIC ACTIVITIES: CPT

## 2025-07-01 NOTE — PROGRESS NOTES
Discharge Summary  Pt has attended 9 visits in Physical Therapy.     Patient: Harriet Seals (19 year old, female) Referring Provider:  Insurance:   Diagnosis: Urinary urgency (R39.15) Mary Merino  [employer]   Date of Surgery: n/a Next MD visit:  N/A   Precautions:  None (hx of open heart surgery) none scheduled yet Referral Information:    Date of Evaluation: Req: 12, Auth: 12, Exp: 7/15/2025    03/25/25 POC Auth Visits:  12        Today's Date   7/1/2025    Subjective  Doing perineal stretching, feels like she is emptying bladder better overall, stopped taking miralax, has increased water intake.       Pain:  0/10 pelvic region pain     Objective  see flowsheet below      PFDI 20    Scores   POPDI 6:   12.5    CRAD 8:   6.25    NIDA 6:   0    Summary:   18.75          Assessment  Pt has met all PT goals, ready and agreeable to discharge at this time. Significant improvement in PFDI20 score as compared to IE. Pt indep with symptom management.    Goals (to be met in 10 visits)      Not Met Progress Toward Partially Met Met   Patient will report decreased urinary frequency to <8x/day and 0x/night indicating normalizing micturition reflex for improved bladder health and function. [] [] [] [x]   Patient will report ability to postpone urination for at least 10 mins after initial urge presents for improved ability to participate in community outings without fear of UI. [] [] [] [x]   Patient will report increased BM frequency to at least 1x every other day indicating improved stool motility and overall bowel health. [] [] [] [x]   Patient will report consumption of at least 25 grams of daily fiber and 48 ounces of water for improved stool consistency. [] [] [] [x]   Patient will report adherence to HEP for continued exercise benefits following cessation of PT. [] [] [] [x]        Plan  discharge to home program    Treatment Last 4 Visits  Treatment Day: 9       5/6/2025 5/20/2025 6/3/2025 7/1/2025   Pelvic Treatment    Therapeutic Exercise  Doorway stretch 10\" x5  Seated pelvic tilts x10  Seated fig 4 R/L x10  Seated resisted ER RTB x10  Supine resisted ER RTB x10  Rainer stretch R/L x1 min  Cobra pose + DB x1 min   Reviewed HEP    Manual Therapy   Informed consent for internal pelvic assessment/treatment given: YES    Internal PFM MFR, focus on R side          Therapeutic Activity Reviewed bladder diary  Reviewed bladder habit and modifications recommended  Reviewed bowel habits and modifications recommended Reviewed importance of maintaining healthy bladder habits in times of increased stress, with focus on: hydration, diaphragmatic breathing, stretching Reviewed bladder/bowel healthy habits  Reviewed modified use of miralax to continue bowel regularity  Internal PFM re-assessment  Pt ed on rec for decreased frequency of PT sessions to every 2-3 weeks to progress toward indep symptom management   Reviewed progress toward goals, and outcomes of PFDI20 score  Reviewed manual PFM self-stretching  Reviewed impact of stress and hydration on bowel/bladder function  Reviewed home program   Therapeutic Exercise Minutes  25 8    Manual Therapy Minutes   15    Therapeutic Activity Minutes 45 15 20 38   Other Therapy Minutes   4    Total Time Of Timed Procedures 45 40 43 38   Total Time Of Service-Based Procedures 0 0 4 0   Total Treatment Time 45 40 47 38   HEP No changes Added:  - Doorway Pec Stretch at 90 Degrees Abduction  - 1 x daily - 7 x weekly - 1 sets - 10 reps  - Seated Pelvic Tilt  - 1 x daily - 7 x weekly - 1 sets - 10 reps  - Seated Piriformis Stretch with Trunk Bend  - 1 x daily - 7 x weekly - 1 sets - 3 reps - 30 sec hold  - Shoulder External Rotation and Scapular Retraction with Resistance  - 1 x daily - 7 x weekly - 1 sets - 10 reps  - Rainer Stretch  - 1 x daily - 7 x weekly - 1 sets - 1-3 reps - 1 min hold Perineal stretching, every other day Continue with established home program        HEP  Continue with established  home program    Charges  3 TA         Plan: Discharge with HEP. Patient was instructed to follow up with their physician should an exacerbation of symptoms arise.     Thank you for your referral. If you have any questions, please contact me at Dept: [phone].    Sincerely,  Electronically signed by therapist: Silvia Mohr PT     Physician's certification required:  No

## 2025-07-16 ENCOUNTER — LAB ENCOUNTER (OUTPATIENT)
Dept: LAB | Age: 20
End: 2025-07-16
Attending: INTERNAL MEDICINE
Payer: COMMERCIAL

## 2025-07-16 DIAGNOSIS — Z02.0 SCHOOL PHYSICAL EXAM: Primary | ICD-10-CM

## 2025-07-16 LAB
HBV SURFACE AB SER QL: NONREACTIVE
HBV SURFACE AB SERPL IA-ACNC: <3.1 MIU/ML
RUBV IGG SER QL: POSITIVE
RUBV IGG SER-ACNC: 54 IU/ML (ref 10–?)

## 2025-07-16 PROCEDURE — 36415 COLL VENOUS BLD VENIPUNCTURE: CPT

## 2025-07-16 PROCEDURE — 86787 VARICELLA-ZOSTER ANTIBODY: CPT

## 2025-07-16 PROCEDURE — 86706 HEP B SURFACE ANTIBODY: CPT

## 2025-07-16 PROCEDURE — 86735 MUMPS ANTIBODY: CPT

## 2025-07-16 PROCEDURE — 86480 TB TEST CELL IMMUN MEASURE: CPT

## 2025-07-16 PROCEDURE — 86762 RUBELLA ANTIBODY: CPT

## 2025-07-16 PROCEDURE — 86765 RUBEOLA ANTIBODY: CPT

## 2025-07-17 LAB — V ZOSTER IGM: <0.91 INDEX

## 2025-07-18 LAB
M TB IFN-G CD4+ T-CELLS BLD-ACNC: 0.02 IU/ML
M TB TUBERC IFN-G BLD QL: NEGATIVE
M TB TUBERC IGNF/MITOGEN IGNF CONTROL: >10 IU/ML
MEV IGG SER-ACNC: 87.6 AU/ML (ref 16.5–?)
MUV IGG SER IA-ACNC: 44.8 AU/ML (ref 11–?)
QFT TB1 AG MINUS NIL: 0.02 IU/ML
QFT TB2 AG MINUS NIL: 0 IU/ML
VZV IGG SER IA-ACNC: 0.78 (ref 1–?)

## 2025-08-11 ENCOUNTER — OFFICE VISIT (OUTPATIENT)
Dept: DERMATOLOGY CLINIC | Facility: CLINIC | Age: 20
End: 2025-08-11

## 2025-08-11 DIAGNOSIS — B08.1 MOLLUSCUM CONTAGIOSUM: ICD-10-CM

## 2025-08-11 DIAGNOSIS — D22.9 MULTIPLE BENIGN NEVI: Primary | ICD-10-CM

## 2025-08-11 PROCEDURE — 99204 OFFICE O/P NEW MOD 45 MIN: CPT | Performed by: STUDENT IN AN ORGANIZED HEALTH CARE EDUCATION/TRAINING PROGRAM

## 2025-08-11 PROCEDURE — 17110 DESTRUCTION B9 LES UP TO 14: CPT | Performed by: STUDENT IN AN ORGANIZED HEALTH CARE EDUCATION/TRAINING PROGRAM

## 2025-08-11 RX ORDER — ERYTHROMYCIN 5 MG/G
1 OINTMENT OPHTHALMIC NIGHTLY
Qty: 3.5 G | Refills: 2 | Status: SHIPPED | OUTPATIENT
Start: 2025-08-11

## 2025-08-11 RX ORDER — ADAPALENE 0.1 G/100G
CREAM TOPICAL NIGHTLY
COMMUNITY

## (undated) DEVICE — GUIDEWIRE 5 38IN .018IN VASC SPRG TUBE ASMB STRL LF DISP

## (undated) DEVICE — SUTURE PRMHND 2-0 KS 30IN SILK BRAID NABSB BLK 623H

## (undated) DEVICE — CONNECTOR 2:1 SIL CARDIAC DRN 4 CHNL RADOPQ SOLID CORE CNTR

## (undated) DEVICE — TUBING PRSS MNTR 12IN MEDEX PE LOW VOLUME MALE TO FEMALE

## (undated) DEVICE — LOOP VSL THK.9MM MINI 1.3MM BLUE LF RADOPQ FLUID RPLNT

## (undated) DEVICE — CATHETER IV 20GA 1.88IN SHLD NOTCH NDL PSHBTN DEHP-FR BD

## (undated) DEVICE — TRAY CATH SURESTEP CMP CR STLK FOLEY URMTR STAB DEV

## (undated) DEVICE — GLOVE SURG 6.5 PREMIERPRO LF NATURAL PF TXTR SMTH STRL

## (undated) DEVICE — CATHETER BARD 12FR FOLEY RND TIP INTMT AP RBR URTH STRL LTX

## (undated) DEVICE — SENSOR MNTR FORE-SIGHT LG

## (undated) DEVICE — CANISTER SCT 1200CC DISP MDVC GUARDIAN 90 DEGREE ADPR LOCK

## (undated) DEVICE — SOLUTION IRR 1000ML 0.9% NACL PLASTIC POUR BTL ISTNC N-PYRG

## (undated) DEVICE — ADAPTER CRDPLG Y MALE FEMALE LL CLR CD CLAMP 7.5IN DLP 3IN

## (undated) DEVICE — ASSEMBLY ATF SYS SCT ANCOAG STRL

## (undated) DEVICE — PAD DRSG 3X2IN CRD POLY CTN NADH ABS PERFORATE FILM CUT TO

## (undated) DEVICE — Device

## (undated) DEVICE — HMCONC TUBE MINNTECH HEMOCOR HPH 700 ADULT ADPR SET PRFSN

## (undated) DEVICE — WAX BN 2.5G STRL NATURAL

## (undated) DEVICE — BLANKET WRM UNDERBODY PED LG 60X32IN 24X24IN BR HGR PLMR 4.8

## (undated) DEVICE — TUBING SCT CLR 12FT 316IN MDVC MAXI-GRIP NCDTV MALE TO MALE

## (undated) DEVICE — DRAPE IMPRV REINF FENESTRATE ABS ARMBRD CVR LAP 122X106X77IN

## (undated) DEVICE — SOLUTION PREP .14OZ 5% PVP IOD ANSEP 4 SWAB DRIP RST

## (undated) DEVICE — DRESSING TRANS 4.75X4IN ADH HPOAL WTPRF TEGADERM PU STD STRL

## (undated) DEVICE — SUTURE PRMHND 2-0 30IN SILK BRAID TIES 12 STRN PCUT NABSB A305H

## (undated) DEVICE — TRAY CATH 5FR 12CM CENTRAL VNS SPCTRM 3 LUM CRV NDL SUT HLDR

## (undated) DEVICE — GLOVE SURG 6.5 BIOGEL NEODERM LF KHAKI PF ROUGH BEAD CUFF

## (undated) DEVICE — CANNULA PRFSN 22FR 12-15IN .25-38IN TPR 1 STG KINK RST

## (undated) DEVICE — GLOVE SURG 6.5 PREMIERPRO LF GRN PF TXTR STRL PLISPRN DISP

## (undated) DEVICE — SUTURE PROLENE 4-0 RB1 36IN 2 ARM MONO 4 STRN

## (undated) DEVICE — SUTURE PROLENE HEMO-SEAL 5-0 BV-1 24IN 2 ARM MONO NABSB BLUE 9702H

## (undated) DEVICE — CANISTER SCT 90D 3000ML DISP LF MDVC GUARDIAN LOCK LID OVFLW

## (undated) DEVICE — GLOVE SURG 7.5 PREMIERPRO LF GRN PF TXTR STRL PLISPRN DISP

## (undated) DEVICE — SUTURE PROLENE 5-0 C-1 36IN 2 ARM MONO NABSB BLUE 8720H

## (undated) DEVICE — SUTURE VICRYL 0 CT1 36IN BRAID COAT ABS UNDYED J946H

## (undated) DEVICE — HANDPIECE SCT MDVC YNKR STRGT TIP VENT CLR STRL LF DISP

## (undated) DEVICE — TOWEL OR BLU 16X26IN 4PK

## (undated) DEVICE — ELECTRODE PT RTN C30- LB 9FT CORD NONIRRITATE NONSENSITIZE

## (undated) DEVICE — ADAPTER PRFSN 5IN .25IN STRGT VENT MALE LL CNCT DLP

## (undated) DEVICE — DRAPE 2 INCS FILM ANTIMICROBIAL 23X17IN SURG IOBAN STRL

## (undated) DEVICE — CANNULA PRFSN 18FR 24CM 38IN BLUNT VENT CNCT OPTISITE AX

## (undated) DEVICE — SUTURE 2-0 SH SKS-3 24IN SS 2 ARM BRAID NABSB DARK BLUE TPW90

## (undated) DEVICE — WATER STRL 1000ML PLASTIC POUR BTL LF

## (undated) DEVICE — ELECTRODE ESURG BLADE 4IN .2IN 332IN INSULATE STD SHAFT XTD

## (undated) DEVICE — SPONGE GAUZE 4X4IN CTN 12 PLY WOVEN FOLD EDGE TYPE 7 XRAY

## (undated) DEVICE — KIT RM TURNOVER CSTM IC DISP NS LF

## (undated) DEVICE — DRESSING SECUREMENT 2.5X2IN 1 STEP CATH 7 DAY WEAR TIME

## (undated) DEVICE — DRAIN INCS ATR OASIS PLASTIC CHEST THOR TUBE DRY SCT

## (undated) DEVICE — CONTAINER SPEC 4OZ 2.5X2.75IN OR POS INDCTR TMPR EVD LEAK

## (undated) DEVICE — SUTURE VICRYL 2-0 SH 27IN BRAID COAT ABS UNDYED J417H

## (undated) DEVICE — KIT PRSS MNTR PREFUSION DISP STRL LF PED

## (undated) DEVICE — BLADE SAW 1MM 30.5MM THK0.6MM STRNM SYS 6

## (undated) DEVICE — GOWN SURG LG L3 NONREINFORCE SET IN SLV STRL LF DISP BLUE

## (undated) DEVICE — SENSOR SHUNT CDI HEP 1.2ML SYS 500 STRL DISP

## (undated) DEVICE — SUTURE PRMHND 2-0 SH 18IN SILK CNTRL RELS BRAID 8 STRN NABSB C012D

## (undated) DEVICE — SUTURE 5 CCS 18IN SS MONO 4 STRN NABSB SLVR M653G

## (undated) DEVICE — BLADE SAW 30MM THK.38MM 30MM OFFSET FAN STRL LF DISP

## (undated) DEVICE — POSITIONER PSTN 9IN DONUT HEAD FOAM

## (undated) DEVICE — ADHESIVE PREMIERPRO EXOFIN 1ML HVISC TUBE SOFT FLXB APL

## (undated) DEVICE — CUVETTE HS 38X38IN CDI SYS

## (undated) DEVICE — SUTURE MONOCRYL MTPS 4-0 PS2 18IN MONO ABS UNDYED

## (undated) DEVICE — GLOVE SURG 6 PREMIERPRO LF NATURAL PF TXTR SMTH STRL

## (undated) DEVICE — CANNULA PRFSN 28FR 12-15IN 38IN 1 STG KINK RST WRWND BODY

## (undated) DEVICE — BLADE SURG 11 UNFRM SHARPNESS STRL SS

## (undated) NOTE — LETTER
?   PREPARTICIPATION PHYSICAL EVALUATION  MEDICAL ELIGIBILITY FORM  [x] Medically eligible for all sports without restrictions   [] Medically eligible for all sports without restriction with recommendations for further evaluation or treatment     []Medicall e)      Muscle or body aches Yes  No    f)       Headache Yes  No    g)      New loss of taste or smell Yes  No    h)      Sore throat Yes  No    i)       Congestion or runny nose Yes  No    j)       Nausea or vomiting Yes  No    k)      Diarrhea Yes  No ([email or web address])  • COVID?19 Interim Guidance: Return to Sports and Physical Activity ([email or web address])    ? PREPARTICIPATION PHYSICAL EVALUATION   HISTORY FORM  Note: Complete and sign this form (with your parents if younger than 25) before your appointment.   Name: Jose Byrnes out or nearly passed out during or after exercise? [] [] 5. Have you ever had discomfort, pain, tightness, or pressure in your chest during exercise? [] [] 6.    Does your heart ever race, flutter in your chest, or skip beats (irregular beats) during exer a painful bulge or hernia in the groin area? [] []   19. Do you have any recurring skin rashes or rashes that come and go, including herpes or methicillin-resistant Staphylococcus aureus (MRSA)?  [] []   20.   Have you had a concussion or head injury that athlete:____________________________________________________________________________________________  Signature of parent or gaurdian:__________________________________________________________________________________     Date: 11/16/2021    ?   PREPARTICIPA ____________________

## (undated) NOTE — LETTER
[facility] [employer] of Child Health Examination       Student's Name  JEOVANY Sanchez Da Title                           Date     Signature HEALTH HISTORY          TO BE COMPLETED AND SIGNED BY PARENT/GUARDIAN AND VERIFIED BY HEALTH CARE PROVIDER    ALLERGIES  (Food, drug, insect, other) MEDICATION  (List all prescribed or taken on a regular basis.)     Diagnosis of asthma?   Child wakes during BMI 17.82 kg/m²     DIABETES SCREENING  BMI>85% age/sex  No And any two of the following:  Family History Yes   Ethnic Minority  No          Signs of Insulin Resistance (hypertension, dyslipidemia, polycystic ovarian syndrome, acanthosis nigricans)    No Quick-relief  medication (e.g. Short Acting Beta Antagonist): No          Controller medication (e.g. inhaled corticosteroid):   No Other   NEEDS/MODIFICATIONS required in the school setting  None DIETARY Needs/Restrictions     None   SPECIAL INSTR

## (undated) NOTE — LETTER
[facility] [employer] of Child Health Examination       Student's Name  Manjit Sanchez D Title          APRN                 Date  4/10/2019   Signature HEALTH HISTORY          TO BE COMPLETED AND SIGNED BY PARENT/GUARDIAN AND VERIFIED BY HEALTH CARE PROVIDER    ALLERGIES  (Food, drug, insect, other)  Patient has no known allergies.  MEDICATION  (List all prescribed or taken on a regular basis.)  No current /68   Ht 4' 10.75\" (1.492 m)   Wt 39.9 kg (88 lb)   BMI 17.93 kg/m²     DIABETES SCREENING  BMI>85% age/sex  No And any two of the following:  Family History Yes    Ethnic Minority  Yes          Signs of Insulin Resistance (hypertension, dyslipidemi Currently Prescribed Asthma Medication:            Quick-relief  medication (e.g. Short Acting Beta Antagonist): No          Controller medication (e.g. inhaled corticosteroid):   No Other   NEEDS/MODIFICATIONS required in the school setting  None DIET

## (undated) NOTE — LETTER
[facility] [employer] of Child Health Examination       Student's Name  Newton Drummonddanielle Sanchez D Date  11/16/2021   Signature                                                                                                                                              Title                           Date    (If adding dates to th PROVIDER    ALLERGIES  (Food, drug, insect, other)  Patient has no known allergies.  MEDICATION  (List all prescribed or taken on a regular basis.)    Current Outpatient Medications:   •  Econazole Nitrate 1 % External Cream, Apply to feet once daily, Disp: Wt 38.6 kg (85 lb)   BMI 17.17 kg/m²     DIABETES SCREENING  BMI>85% age/sex  No And any two of the following:  Family History No    Ethnic Minority  No          Signs of Insulin Resistance (hypertension, dyslipidemia, polycystic ovarian syndrome, acanthos Quick-relief  medication (e.g. Short Acting Beta Antagonist): No          Controller medication (e.g. inhaled corticosteroid):   No Other   NEEDS/MODIFICATIONS required in the school setting  None DIETARY Needs/Restrictions     None   SPECIAL INSTRUCTIONS/

## (undated) NOTE — LETTER
VACCINE ADMINISTRATION RECORD  PARENT / GUARDIAN APPROVAL  Date: 3/16/2021  Vaccine administered to: Marcos Mahajan     : 2005    MRN: [id number]    A copy of the appropriate Centers for Disease Control and Prevention Vaccine Information statement

## (undated) NOTE — LETTER
VACCINE ADMINISTRATION RECORD  PARENT / GUARDIAN APPROVAL  Date: 2022  Vaccine administered to: Jerod Valle     : 2005    MRN: [id number]    A copy of the appropriate Centers for Disease Control and Prevention Vaccine Information statement has been provided. I have read or have had explained the information about the diseases and the vaccines listed below. There was an opportunity to ask questions and any questions were answered satisfactorily. I believe that I understand the benefits and risks of the vaccine cited and ask that the vaccine(s) listed below be given to me or to the person named above (for whom I am authorized to make this request). VACCINES ADMINISTERED:  Men B    I have read and hereby agree to be bound by the terms of this agreement as stated above. My signature is valid until revoked by me in writing. This document is signed by , relationship: Parents on 2022.:                                                                                                  2022                         Parent / Hans Arboleda Signature                                                Date    Navarro Leyva served as a witness to authentication that the identity of the person signing electronically is in fact the person represented as signing. This document was generated by Connor Quiñonez MA on 2022.

## (undated) NOTE — LETTER
[facility] [employer] Child Health Examination       Student's Name  Mary LORENZO Title                           Date     Signature HEALTH HISTORY          TO BE COMPLETED AND SIGNED BY PARENT/GUARDIAN AND VERIFIED BY HEALTH CARE PROVIDER    ALLERGIES  (Food, drug, insect, other)  Patient has no known allergies.  MEDICATION  (List all prescribed or taken on a regular basis.)  No current /68   Ht 4' 10.75\" (1.492 m)   Wt 39.9 kg (88 lb)   BMI 17.93 kg/m²     DIABETES SCREENING  BMI>85% age/sex  {YES_NO:585::\"No\"} And any two of the following:  Family History {YES_NO:585::\"No\"}    Ethnic Minority  {YES_NO:585::\"No\"}          Si Eyes {YES:829::\"Yes\"}     Screen result:   Genito-Urinary {YES:829::\"Yes\"}  LMP   Nose {YES:829::\"Yes\"}  Neurological {YES:829::\"Yes\"}    Throat {YES:829::\"Yes\"}  Musculoskeletal {YES:829::\"Yes\"}    Mouth/Dental {YES:829::\"Yes\"}  Spinal exami Signature                                                                                Date  4/10/2019   Address/Phone  Karen Mao [place]DISON  [address] , [address]  [phone]   Rev 11/15

## (undated) NOTE — LETTER
VACCINE ADMINISTRATION RECORD  PARENT / GUARDIAN APPROVAL  Date: 2019  Vaccine administered to: Jose E Child     : 2005    MRN: [id number]    A copy of the appropriate Centers for Disease Control and Prevention Vaccine Information statement

## (undated) NOTE — LETTER
VACCINE ADMINISTRATION RECORD  PARENT / GUARDIAN APPROVAL  Date: 2021  Vaccine administered to: Karine Ortiz     : 2005    MRN: [id number]    A copy of the appropriate Centers for Disease Control and Prevention Vaccine Information statement

## (undated) NOTE — LETTER
[facility] [employer] Child Health Examination       Student's Name  Smiley Sanchez D Title                           Date  4/10/2019   Signature Grade Level/ID#  8th Grade   HEALTH HISTORY          TO BE COMPLETED AND SIGNED BY PARENT/GUARDIAN AND VERIFIED BY HEALTH CARE PROVIDER    ALLERGIES  (Food, drug, insect, other)  Patient has no known allergies.  MEDICATION  (List all prescribed or taken on PHYSICAL EXAMINATION REQUIREMENTS (head circumference if <33 years old):   /68   Ht 4' 10.75\" (1.492 m)   Wt 39.9 kg (88 lb)   BMI 17.93 kg/m²     DIABETES SCREENING  BMI>85% age/sex  No And any two of the following:  Family History Yes    Ethnic M Respiratory Yes                   Diagnosis of Asthma: No Mental Health Yes        Currently Prescribed Asthma Medication:            Quick-relief  medication (e.g. Short Acting Beta Antagonist): No          Controller medication (e.g. inhaled corticostero

## (undated) NOTE — LETTER
September 19, 2023      No Recipients     Patient: Deep Laura   YOB: 2005   Date of Visit: 9/19/2023       Dear Dr. Tammie Epperson Recipients: Thank you for referring Deep Laura to me for evaluation. Here is my assessment and plan of care:    Deep Laura is a 22year old female. HPI:     HPI    Pt here today for an office visit with redness and irritation in the left eye for the past 4 weeks. Pt has been using sulfacetamide eye drops every three hours since 9/14/23 when she saw her PCP. Pt also tried using clear eyes gtts before going to her PCP and only found temporary relief before symptoms returned worse. Pt started wearing lash extensions 3 and a half weeks ago and removed them yesterday. Pt states removing lash extensions did not help relieve symptoms. Pt does not know when her last eye exam was. Pt has never had any surgeries done to the eyes. Consult: per Dr. Karl Berg  Last edited by Darren Salinas OT on 9/19/2023 10:46 AM.        Patient History:  History reviewed. No pertinent past medical history. Surgical History: Deep Laura has a past surgical history that includes anesth,open heart surgery (06/14/2023). Family History   Problem Relation Age of Onset    Diabetes Paternal Grandmother     Diabetes Maternal Grandmother     Glaucoma Neg     Heart Disorder Neg     Hypertension Neg     Cancer Neg     Asthma Neg     Depression Neg     Lipids Neg        Social History:   Social History     Socioeconomic History    Marital status: Single   Tobacco Use    Smoking status: Never    Smokeless tobacco: Never   Substance and Sexual Activity    Alcohol use: Never    Drug use: Never   Other Topics Concern    Second-hand smoke exposure No       Medications:  Current Outpatient Medications   Medication Sig Dispense Refill    prednisoLONE (PRED FORTE) 1 % Ophthalmic Suspension Place 1 drop into the left eye 4 (four) times daily for 7 days.  5 mL 0    apixaban 5 MG Oral Tab Take 1 tablet (5 mg total) by mouth 2 (two) times daily. Allergies:  No Known Allergies    ROS:     ROS    Positive for: Eyes  Negative for: Constitutional, Gastrointestinal, Neurological, Skin, Genitourinary, Musculoskeletal, HENT, Endocrine, Cardiovascular, Respiratory, Psychiatric, Allergic/Imm, Heme/Lymph  Last edited by Liudmila Soria OLENCHO on 9/19/2023 10:13 AM.          PHYSICAL EXAM:     Base Eye Exam       Visual Acuity (Snellen - Linear)         Right Left    Dist sc 20/20 20/30 +2              Pupils         Pupils    Right PERRL    Left PERRL              Visual Fields         Left Right     Full Full              Extraocular Movement         Right Left     Full, Ortho Full, Ortho                  Slit Lamp and Fundus Exam       Slit Lamp Exam         Right Left    Lids/Lashes Meibomian gland dysfunction Meibomian gland dysfunction    Conjunctiva/Sclera non-injected 1+ Injection    Cornea Clear, no fluorescein stain Clear, no fluorescein stain    Anterior Chamber Deep and quiet Deep and quiet    Iris Normal Normal                  Refraction       Wearing Rx       Type: No glasses                     ASSESSMENT/PLAN:     Diagnoses and Plan:     Conjunctivitis of left eye  Use warm compresses for discharge and crusting. Use cool compresses as needed for swelling and comfort. Start Pred forte drops: Use one drop 4 times a day in the left eye  for one week, then discontinue. Patient should call our office and make an appointment for a return visit if symptoms worsen or do not begin to improve. No orders of the defined types were placed in this encounter. Meds This Visit:  Requested Prescriptions     Signed Prescriptions Disp Refills    prednisoLONE (PRED FORTE) 1 % Ophthalmic Suspension 5 mL 0     Sig: Place 1 drop into the left eye 4 (four) times daily for 7 days. Follow up instructions:  Return if symptoms worsen or fail to improve.     9/19/2023  Scribed by: Delroy Raman, MD        If you have questions, please do not hesitate to call me. I look forward to following Chrystal Barbosa along with you.     Sincerely,        Inna Hawthorne MD        CC:   No Recipients    Document electronically generated by: Inna Hawthorne MD

## (undated) NOTE — LETTER
Patient Name: Harriet Seals  YOB: 2005          MRN :  [id number]  Date:  7/1/2025  Referring Physician:  Mary Merino    Discharge Summary  Pt has attended 9 visits in Physical Therapy.     Patient: Harriet Seals (19 year old, female) Referring Provider:  Insurance:   Diagnosis: Urinary urgency (R39.15) Mary Angelique  [employer] [place]   Date of Surgery: n/a Next MD visit:  N/A   Precautions:  None (hx of open heart surgery) none scheduled yet Referral Information:    Date of Evaluation: Req: 12, Auth: 12, Exp: 7/15/2025    03/25/25 POC Auth Visits:  12        Today's Date   7/1/2025    Subjective  Doing perineal stretching, feels like she is emptying bladder better overall, stopped taking miralax, has increased water intake.       Pain:  0/10 pelvic region pain     Objective  see flowsheet below               21st Century Cures Act Notice to Patient: Medical documents like this are made available to patients in the interest of transparency. However, be advised this is a medical document and it is intended as [id number]-[id number] communication between your medical providers. This medical document may contain abbreviations, assessments, medical data, and results or other terms that are unfamiliar. Medical documents are intended to carry relevant information, facts as evident, and the clinical opinion of the practitioner. As such, this medical document may be written in language that appears blunt or direct. You are encouraged to contact your medical provider and/or [facility] Patient Experience if you have any questions about this medical document.

## (undated) NOTE — LETTER
Name:  Kelsea Gallardo Year:  8th Grade Class: Student ID No.:   Address:  [address] [place] [address] Phone:  [phone] (home)  :  14year old   Name Relationship [address] Work Phone Home Phone Mobile Phone   1.  mEy Graves implanted defibrillator? 12. Has anyone in your family had unexplained fainting, seizures, or near drowning?      BONE AND JOINT QUESTIONS Yes No   17. Have you ever had an injury to a bone, muscle, ligament, or tendon that caused you to miss a practice 39.Have you ever been unable to move your arms / legs after being hit /fall? 40. Have you ever become ill while exercising in the heat?     41. Do you get frequent muscle cramps when exercising? 42.  Do you or someone in your family have sickle cell · Location of point of maximal impulse (PMI) Yes    Pulses Yes    Lungs Yes    Abdomen Yes    Genitourinary (males only)* N/A    Skin:  HSV, lesions suggestive of MRSA, tinea corporis Yes    Neurologic* Yes    MUSCULOSKELETAL     Neck Yes    Back Yes    Sh performance-enhancing substances in my/his/her body either during [employer] state [employer] events or during the school day, and I/our student do/does hereby agree to submit to such testing and analysis by a certified laboratory.  We further understand and agree th